# Patient Record
Sex: FEMALE | Race: WHITE | NOT HISPANIC OR LATINO | Employment: OTHER | URBAN - METROPOLITAN AREA
[De-identification: names, ages, dates, MRNs, and addresses within clinical notes are randomized per-mention and may not be internally consistent; named-entity substitution may affect disease eponyms.]

---

## 2024-01-08 ENCOUNTER — HOSPITAL ENCOUNTER (INPATIENT)
Facility: HOSPITAL | Age: 89
LOS: 5 days | Discharge: NURSING, REHAB OR LONG-TERM CARE | DRG: 522 | End: 2024-01-13
Attending: EMERGENCY MEDICINE | Admitting: INTERNAL MEDICINE
Payer: MEDICARE

## 2024-01-08 ENCOUNTER — APPOINTMENT (OUTPATIENT)
Dept: RADIOLOGY | Facility: HOSPITAL | Age: 89
DRG: 522 | End: 2024-01-08
Payer: MEDICARE

## 2024-01-08 ENCOUNTER — PREP FOR PROCEDURE (OUTPATIENT)
Dept: OPERATING ROOM | Facility: HOSPITAL | Age: 89
End: 2024-01-08
Payer: MEDICARE

## 2024-01-08 DIAGNOSIS — S72.001P CLOSED FRACTURE OF NECK OF RIGHT FEMUR WITH MALUNION: ICD-10-CM

## 2024-01-08 DIAGNOSIS — S72.8X9A: Primary | ICD-10-CM

## 2024-01-08 DIAGNOSIS — S72.001A CLOSED FRACTURE OF NECK OF RIGHT FEMUR, INITIAL ENCOUNTER (MULTI): Primary | ICD-10-CM

## 2024-01-08 DIAGNOSIS — M79.89 OTHER SPECIFIED SOFT TISSUE DISORDERS: ICD-10-CM

## 2024-01-08 DIAGNOSIS — S72.001A CLOSED FRACTURE OF NECK OF RIGHT FEMUR, INITIAL ENCOUNTER (MULTI): ICD-10-CM

## 2024-01-08 DIAGNOSIS — R22.43 LOCALIZED SWELLING, MASS AND LUMP, LOWER LIMB, BILATERAL: ICD-10-CM

## 2024-01-08 LAB
ABO GROUP (TYPE) IN BLOOD: NORMAL
ALBUMIN SERPL BCP-MCNC: 4.1 G/DL (ref 3.4–5)
ALP SERPL-CCNC: 98 U/L (ref 33–136)
ALT SERPL W P-5'-P-CCNC: 40 U/L (ref 7–45)
ANION GAP SERPL CALC-SCNC: 13 MMOL/L (ref 10–20)
ANTIBODY SCREEN: NORMAL
APTT PPP: 34 SECONDS (ref 27–38)
AST SERPL W P-5'-P-CCNC: 68 U/L (ref 9–39)
BILIRUB SERPL-MCNC: 0.6 MG/DL (ref 0–1.2)
BUN SERPL-MCNC: 12 MG/DL (ref 6–23)
CALCIUM SERPL-MCNC: 9.3 MG/DL (ref 8.6–10.3)
CHLORIDE SERPL-SCNC: 101 MMOL/L (ref 98–107)
CO2 SERPL-SCNC: 27 MMOL/L (ref 21–32)
CREAT SERPL-MCNC: 0.66 MG/DL (ref 0.5–1.05)
EGFRCR SERPLBLD CKD-EPI 2021: 84 ML/MIN/1.73M*2
ERYTHROCYTE [DISTWIDTH] IN BLOOD BY AUTOMATED COUNT: 13.2 % (ref 11.5–14.5)
GLUCOSE SERPL-MCNC: 119 MG/DL (ref 74–99)
HCT VFR BLD AUTO: 44.4 % (ref 36–46)
HGB BLD-MCNC: 14.6 G/DL (ref 12–16)
HOLD SPECIMEN: NORMAL
INR PPP: 1.1 (ref 0.9–1.1)
MCH RBC QN AUTO: 29.9 PG (ref 26–34)
MCHC RBC AUTO-ENTMCNC: 32.9 G/DL (ref 32–36)
MCV RBC AUTO: 91 FL (ref 80–100)
NRBC BLD-RTO: 0 /100 WBCS (ref 0–0)
PLATELET # BLD AUTO: 199 X10*3/UL (ref 150–450)
POTASSIUM SERPL-SCNC: 4.6 MMOL/L (ref 3.5–5.3)
PROT SERPL-MCNC: 8.2 G/DL (ref 6.4–8.2)
PROTHROMBIN TIME: 12.5 SECONDS (ref 9.8–12.8)
RBC # BLD AUTO: 4.88 X10*6/UL (ref 4–5.2)
RH FACTOR (ANTIGEN D): NORMAL
SODIUM SERPL-SCNC: 136 MMOL/L (ref 136–145)
WBC # BLD AUTO: 6 X10*3/UL (ref 4.4–11.3)

## 2024-01-08 PROCEDURE — 96375 TX/PRO/DX INJ NEW DRUG ADDON: CPT

## 2024-01-08 PROCEDURE — 80053 COMPREHEN METABOLIC PANEL: CPT

## 2024-01-08 PROCEDURE — 36415 COLL VENOUS BLD VENIPUNCTURE: CPT | Performed by: EMERGENCY MEDICINE

## 2024-01-08 PROCEDURE — 36415 COLL VENOUS BLD VENIPUNCTURE: CPT

## 2024-01-08 PROCEDURE — 70450 CT HEAD/BRAIN W/O DYE: CPT

## 2024-01-08 PROCEDURE — 2500000004 HC RX 250 GENERAL PHARMACY W/ HCPCS (ALT 636 FOR OP/ED): Performed by: STUDENT IN AN ORGANIZED HEALTH CARE EDUCATION/TRAINING PROGRAM

## 2024-01-08 PROCEDURE — 1100000001 HC PRIVATE ROOM DAILY

## 2024-01-08 PROCEDURE — 72125 CT NECK SPINE W/O DYE: CPT | Performed by: RADIOLOGY

## 2024-01-08 PROCEDURE — 99285 EMERGENCY DEPT VISIT HI MDM: CPT | Performed by: EMERGENCY MEDICINE

## 2024-01-08 PROCEDURE — 73552 X-RAY EXAM OF FEMUR 2/>: CPT | Mod: RT

## 2024-01-08 PROCEDURE — 2500000001 HC RX 250 WO HCPCS SELF ADMINISTERED DRUGS (ALT 637 FOR MEDICARE OP): Performed by: STUDENT IN AN ORGANIZED HEALTH CARE EDUCATION/TRAINING PROGRAM

## 2024-01-08 PROCEDURE — 73552 X-RAY EXAM OF FEMUR 2/>: CPT | Mod: RIGHT SIDE | Performed by: RADIOLOGY

## 2024-01-08 PROCEDURE — 86920 COMPATIBILITY TEST SPIN: CPT

## 2024-01-08 PROCEDURE — 86850 RBC ANTIBODY SCREEN: CPT | Performed by: EMERGENCY MEDICINE

## 2024-01-08 PROCEDURE — 71045 X-RAY EXAM CHEST 1 VIEW: CPT | Performed by: RADIOLOGY

## 2024-01-08 PROCEDURE — 96374 THER/PROPH/DIAG INJ IV PUSH: CPT

## 2024-01-08 PROCEDURE — 2500000004 HC RX 250 GENERAL PHARMACY W/ HCPCS (ALT 636 FOR OP/ED)

## 2024-01-08 PROCEDURE — 73502 X-RAY EXAM HIP UNI 2-3 VIEWS: CPT | Mod: RT

## 2024-01-08 PROCEDURE — 70450 CT HEAD/BRAIN W/O DYE: CPT | Performed by: RADIOLOGY

## 2024-01-08 PROCEDURE — 71045 X-RAY EXAM CHEST 1 VIEW: CPT

## 2024-01-08 PROCEDURE — 72125 CT NECK SPINE W/O DYE: CPT

## 2024-01-08 PROCEDURE — 85610 PROTHROMBIN TIME: CPT | Performed by: EMERGENCY MEDICINE

## 2024-01-08 PROCEDURE — 73502 X-RAY EXAM HIP UNI 2-3 VIEWS: CPT | Mod: RIGHT SIDE | Performed by: RADIOLOGY

## 2024-01-08 PROCEDURE — 85027 COMPLETE CBC AUTOMATED: CPT

## 2024-01-08 RX ORDER — HEPARIN SODIUM 5000 [USP'U]/ML
5000 INJECTION, SOLUTION INTRAVENOUS; SUBCUTANEOUS EVERY 8 HOURS SCHEDULED
Status: DISCONTINUED | OUTPATIENT
Start: 2024-01-08 | End: 2024-01-09

## 2024-01-08 RX ORDER — POLYETHYLENE GLYCOL 3350 17 G/17G
17 POWDER, FOR SOLUTION ORAL DAILY
Status: DISCONTINUED | OUTPATIENT
Start: 2024-01-08 | End: 2024-01-09

## 2024-01-08 RX ORDER — ATORVASTATIN CALCIUM 40 MG/1
40 TABLET, FILM COATED ORAL DAILY
COMMUNITY

## 2024-01-08 RX ORDER — LEVOTHYROXINE SODIUM 75 UG/1
75 TABLET ORAL
Status: DISCONTINUED | OUTPATIENT
Start: 2024-01-09 | End: 2024-01-09

## 2024-01-08 RX ORDER — ASPIRIN 81 MG/1
81 TABLET ORAL DAILY
COMMUNITY

## 2024-01-08 RX ORDER — ATORVASTATIN CALCIUM 40 MG/1
40 TABLET, FILM COATED ORAL DAILY
Status: DISCONTINUED | OUTPATIENT
Start: 2024-01-09 | End: 2024-01-09

## 2024-01-08 RX ORDER — ONDANSETRON HYDROCHLORIDE 2 MG/ML
4 INJECTION, SOLUTION INTRAVENOUS ONCE
Status: COMPLETED | OUTPATIENT
Start: 2024-01-08 | End: 2024-01-08

## 2024-01-08 RX ORDER — AMLODIPINE BESYLATE 5 MG/1
5 TABLET ORAL DAILY
Status: DISCONTINUED | OUTPATIENT
Start: 2024-01-09 | End: 2024-01-09

## 2024-01-08 RX ORDER — AMLODIPINE BESYLATE 5 MG/1
5 TABLET ORAL DAILY
COMMUNITY

## 2024-01-08 RX ORDER — OXYCODONE HYDROCHLORIDE 5 MG/1
5 TABLET ORAL EVERY 4 HOURS PRN
Status: DISCONTINUED | OUTPATIENT
Start: 2024-01-08 | End: 2024-01-09

## 2024-01-08 RX ORDER — CHOLECALCIFEROL (VITAMIN D3) 50 MCG
50 TABLET ORAL DAILY
COMMUNITY

## 2024-01-08 RX ORDER — LEVOTHYROXINE SODIUM 75 UG/1
75 TABLET ORAL
COMMUNITY

## 2024-01-08 RX ORDER — MORPHINE SULFATE 4 MG/ML
4 INJECTION, SOLUTION INTRAMUSCULAR; INTRAVENOUS ONCE
Status: COMPLETED | OUTPATIENT
Start: 2024-01-08 | End: 2024-01-08

## 2024-01-08 RX ORDER — ASPIRIN 81 MG/1
81 TABLET ORAL DAILY
Status: DISCONTINUED | OUTPATIENT
Start: 2024-01-09 | End: 2024-01-09

## 2024-01-08 RX ADMIN — OXYCODONE HYDROCHLORIDE 5 MG: 5 TABLET ORAL at 20:29

## 2024-01-08 RX ADMIN — HEPARIN SODIUM 5000 UNITS: 5000 INJECTION INTRAVENOUS; SUBCUTANEOUS at 23:25

## 2024-01-08 RX ADMIN — ONDANSETRON 4 MG: 2 INJECTION INTRAMUSCULAR; INTRAVENOUS at 17:43

## 2024-01-08 RX ADMIN — MORPHINE SULFATE 4 MG: 4 INJECTION, SOLUTION INTRAMUSCULAR; INTRAVENOUS at 17:43

## 2024-01-08 ASSESSMENT — LIFESTYLE VARIABLES
EVER FELT BAD OR GUILTY ABOUT YOUR DRINKING: NO
HAVE YOU EVER FELT YOU SHOULD CUT DOWN ON YOUR DRINKING: NO
HAVE PEOPLE ANNOYED YOU BY CRITICIZING YOUR DRINKING: NO
REASON UNABLE TO ASSESS: NO
EVER HAD A DRINK FIRST THING IN THE MORNING TO STEADY YOUR NERVES TO GET RID OF A HANGOVER: NO

## 2024-01-08 ASSESSMENT — PAIN DESCRIPTION - LOCATION
LOCATION: LEG
LOCATION: HIP

## 2024-01-08 ASSESSMENT — COGNITIVE AND FUNCTIONAL STATUS - GENERAL
EATING MEALS: A LITTLE
PERSONAL GROOMING: A LITTLE
HELP NEEDED FOR BATHING: A LITTLE
DRESSING REGULAR UPPER BODY CLOTHING: A LITTLE
DAILY ACTIVITIY SCORE: 18
DRESSING REGULAR LOWER BODY CLOTHING: A LITTLE
MOBILITY SCORE: 9
CLIMB 3 TO 5 STEPS WITH RAILING: TOTAL
MOVING FROM LYING ON BACK TO SITTING ON SIDE OF FLAT BED WITH BEDRAILS: A LITTLE
MOVING TO AND FROM BED TO CHAIR: TOTAL
WALKING IN HOSPITAL ROOM: TOTAL
TURNING FROM BACK TO SIDE WHILE IN FLAT BAD: A LOT
STANDING UP FROM CHAIR USING ARMS: TOTAL
TOILETING: A LITTLE

## 2024-01-08 ASSESSMENT — PAIN SCALES - GENERAL
PAINLEVEL_OUTOF10: 7
PAINLEVEL_OUTOF10: 10 - WORST POSSIBLE PAIN
PAINLEVEL_OUTOF10: 5 - MODERATE PAIN
PAINLEVEL_OUTOF10: 4
PAINLEVEL_OUTOF10: 5 - MODERATE PAIN
PAINLEVEL_OUTOF10: 0 - NO PAIN

## 2024-01-08 ASSESSMENT — PAIN - FUNCTIONAL ASSESSMENT
PAIN_FUNCTIONAL_ASSESSMENT: 0-10

## 2024-01-08 ASSESSMENT — PAIN DESCRIPTION - PAIN TYPE: TYPE: ACUTE PAIN

## 2024-01-08 ASSESSMENT — COLUMBIA-SUICIDE SEVERITY RATING SCALE - C-SSRS
6. HAVE YOU EVER DONE ANYTHING, STARTED TO DO ANYTHING, OR PREPARED TO DO ANYTHING TO END YOUR LIFE?: NO
2. HAVE YOU ACTUALLY HAD ANY THOUGHTS OF KILLING YOURSELF?: NO
1. IN THE PAST MONTH, HAVE YOU WISHED YOU WERE DEAD OR WISHED YOU COULD GO TO SLEEP AND NOT WAKE UP?: NO

## 2024-01-08 ASSESSMENT — PAIN DESCRIPTION - PROGRESSION: CLINICAL_PROGRESSION: NOT CHANGED

## 2024-01-08 ASSESSMENT — PAIN DESCRIPTION - ORIENTATION: ORIENTATION: RIGHT

## 2024-01-08 NOTE — ED PROVIDER NOTES
HPI   Chief Complaint   Patient presents with    Fall    Hip Pain       HPI  Lona is a 88-year-old female who presents to the ED with complaint of fall and hip pain.  Patient reports that she was going up a step when she lost her balance and fell 2 steps to the ground landing on her right side.  Her medical history is significant for high blood pressure, stroke, and osteoarthritis.  Patient is not on blood thinners.  She rates the pain in her hip a 5 out of 10 in severity.  Patient denies any recent illness symptoms including fever, headache, nausea, vomiting, diarrhea, or urinary symptoms.                  Br[place] Coma Scale Score: 15                  Patient History   Past Medical History:   Diagnosis Date    Personal history of other diseases of the musculoskeletal system and connective tissue 07/24/2014    History of polymyalgia rheumatica     Past Surgical History:   Procedure Laterality Date    CT HEAD ANGIO W AND WO IV CONTRAST  3/1/2019    CT HEAD ANGIO W AND WO IV CONTRAST 3/1/2019 [facility]    HIP SURGERY  08/15/2014    Hip Surgery    THYROID SURGERY  08/15/2014    Thyroid Surgery    TUBAL LIGATION  08/15/2014    Tubal Ligation     No family history on file.  Social History     Tobacco Use    Smoking status: Not on file    Smokeless tobacco: Not on file   Substance Use Topics    Alcohol use: Not on file    Drug use: Not on file       Physical Exam   ED Triage Vitals   Temp Heart Rate Resp BP   01/08/24 1707 01/08/24 1851 01/08/24 1707 01/08/24 1707   36 °C (96.8 °F) 76 17 134/79      SpO2 Temp Source Heart Rate Source Patient Position   01/08/24 1707 01/08/24 1707 01/08/24 1707 01/08/24 1707   96 % Temporal Monitor Lying      BP Location FiO2 (%)     01/08/24 1707 --     Right arm        Physical Exam  Vitals reviewed.   Constitutional:       General: She is not in acute distress.     Appearance: Normal appearance. She is not ill-appearing or toxic-appearing.   HENT:      Head: Normocephalic  and atraumatic.      Right Ear: External ear normal.      Left Ear: External ear normal.      Nose: No congestion or rhinorrhea.      Mouth/Throat:      Pharynx: No oropharyngeal exudate or posterior oropharyngeal erythema.   Eyes:      General: No scleral icterus.        Right eye: No discharge.         Left eye: No discharge.      Extraocular Movements: Extraocular movements intact.      Conjunctiva/sclera: Conjunctivae normal.      Pupils: Pupils are equal, round, and reactive to light.   Cardiovascular:      Rate and Rhythm: Normal rate and regular rhythm.      Pulses: Normal pulses.      Heart sounds: No murmur heard.     No friction rub. No gallop.   Pulmonary:      Effort: Pulmonary effort is normal. No respiratory distress.      Breath sounds: Normal breath sounds. No stridor. No wheezing, rhonchi or rales.   Abdominal:      General: Abdomen is flat. Bowel sounds are normal. There is no distension.      Palpations: Abdomen is soft. There is no mass.      Tenderness: There is no abdominal tenderness. There is no guarding or rebound.   Musculoskeletal:         General: Tenderness (Right leg.), deformity (Fracture of the mid cervical of the right femur) and signs of injury (Right lower extremity.) present. No swelling. Normal range of motion.      Cervical back: Normal range of motion and neck supple. No rigidity.      Right lower leg: No edema.      Left lower leg: No edema.   Skin:     General: Skin is warm.      Capillary Refill: Capillary refill takes less than 2 seconds.      Coloration: Skin is not jaundiced or pale.      Findings: No bruising or lesion.   Neurological:      General: No focal deficit present.      Mental Status: She is alert and oriented to person, place, and time. Mental status is at baseline.   Psychiatric:         Mood and Affect: Mood normal.         Behavior: Behavior normal.         Thought Content: Thought content normal.         ED Course & MDM   Diagnoses as of 01/08/24 8832    Oth fracture of unsp femur, init encntr for closed fracture (CMS/[facility], located within [facility])       Medical Decision Making  Pain is a 88-year-old female who presents to the ED with complaint of fall and hip pain.  Patient is awake, alert, and oriented.  She is well-appearing, nontoxic, and not in apparent distress.  Patient has a pertinent medical history of stroke, and hypertension.  We will order labs to rule out electrolyte abnormality, possible underlying infection, or severe anemia.  Will order checks x-ray to rule out cardiopulmonary abnormality, CT head without IV contrast to rule out intracranial hemorrhage or acute fracture, and CT C-spine to rule out fracture of the spine or injury to the soft tissues.  Cardiac workup was considered but not ordered given her normal vital signs, heart score 4, and no chest pain.  CBC and CMP results are unremarkable.  The chest x-ray is negative for acute cardiopulmonary process.  Radiography of the pelvis and right hip are positive for fracture of the mid cervical of the right femur.    The case was discussed with the attending, Dr. Thao, who saw and evaluated the patient at the bedside.  The recommendation is to admit the patient for Ortho follow-up and repair of the mid cervical of the right femur.  Ortho was consulted and Dr. Barros accepted with the management plan.  Medicine was also consulted for the patient to be admitted.  Dr. Mcqueen will be admitting the patient with a follow-up by Dr. Barros.  The decision to admit was communicated to the patient. she expressed understanding and agreed with the management plan.  The patient was transferred to the medicine floor in a stable condition.          Procedure  Procedures     Best Jasso MD  Resident  01/09/24 6878

## 2024-01-08 NOTE — Clinical Note
Is the patient on isolation?: No   Do you expect the patient to require telemetry (informational-only for bed management): Yes   Do you expect the patient to require observation or inpt? (informational-only for bed management): Inpatient   Bed request comments: Needs Ortho follow up for mid cervical fracture of the right femur repair. Thank you.

## 2024-01-09 ENCOUNTER — ANESTHESIA EVENT (OUTPATIENT)
Dept: OPERATING ROOM | Facility: HOSPITAL | Age: 89
DRG: 522 | End: 2024-01-09
Payer: MEDICARE

## 2024-01-09 ENCOUNTER — APPOINTMENT (OUTPATIENT)
Dept: RADIOLOGY | Facility: HOSPITAL | Age: 89
DRG: 522 | End: 2024-01-09
Payer: MEDICARE

## 2024-01-09 ENCOUNTER — ANESTHESIA (OUTPATIENT)
Dept: OPERATING ROOM | Facility: HOSPITAL | Age: 89
DRG: 522 | End: 2024-01-09
Payer: MEDICARE

## 2024-01-09 PROBLEM — M75.80 ROTATOR CUFF TENDONITIS: Status: ACTIVE | Noted: 2024-01-09

## 2024-01-09 PROBLEM — E03.9 HYPOTHYROIDISM: Status: ACTIVE | Noted: 2024-01-09

## 2024-01-09 PROBLEM — E78.5 HYPERLIPIDEMIA: Status: ACTIVE | Noted: 2024-01-09

## 2024-01-09 PROBLEM — M81.0 OSTEOPOROSIS: Status: ACTIVE | Noted: 2024-01-09

## 2024-01-09 PROBLEM — S72.001A CLOSED FRACTURE OF NECK OF RIGHT FEMUR (MULTI): Status: ACTIVE | Noted: 2024-01-08

## 2024-01-09 PROBLEM — I10 HYPERTENSION: Status: ACTIVE | Noted: 2024-01-09

## 2024-01-09 PROBLEM — R53.1 WEAKNESS: Status: ACTIVE | Noted: 2024-01-09

## 2024-01-09 PROBLEM — K21.9 ESOPHAGEAL REFLUX: Status: ACTIVE | Noted: 2024-01-09

## 2024-01-09 PROBLEM — G45.9 TRANSIENT ISCHEMIC ATTACK: Status: ACTIVE | Noted: 2018-08-12

## 2024-01-09 PROBLEM — I63.9 STROKE (MULTI): Status: ACTIVE | Noted: 2024-01-09

## 2024-01-09 PROBLEM — R26.2 AMBULATORY DYSFUNCTION: Status: ACTIVE | Noted: 2024-01-09

## 2024-01-09 LAB
ALBUMIN SERPL BCP-MCNC: 3.4 G/DL (ref 3.4–5)
ANION GAP SERPL CALC-SCNC: 9 MMOL/L (ref 10–20)
BASOPHILS # BLD AUTO: 0.01 X10*3/UL (ref 0–0.1)
BASOPHILS NFR BLD AUTO: 0.1 %
BUN SERPL-MCNC: 12 MG/DL (ref 6–23)
CALCIUM SERPL-MCNC: 8.8 MG/DL (ref 8.6–10.3)
CHLORIDE SERPL-SCNC: 101 MMOL/L (ref 98–107)
CO2 SERPL-SCNC: 30 MMOL/L (ref 21–32)
CREAT SERPL-MCNC: 0.63 MG/DL (ref 0.5–1.05)
EGFRCR SERPLBLD CKD-EPI 2021: 85 ML/MIN/1.73M*2
EOSINOPHIL # BLD AUTO: 0.01 X10*3/UL (ref 0–0.4)
EOSINOPHIL NFR BLD AUTO: 0.1 %
ERYTHROCYTE [DISTWIDTH] IN BLOOD BY AUTOMATED COUNT: 13.2 % (ref 11.5–14.5)
GLUCOSE SERPL-MCNC: 115 MG/DL (ref 74–99)
HCT VFR BLD AUTO: 42.3 % (ref 36–46)
HGB BLD-MCNC: 13.5 G/DL (ref 12–16)
IMM GRANULOCYTES # BLD AUTO: 0.03 X10*3/UL (ref 0–0.5)
IMM GRANULOCYTES NFR BLD AUTO: 0.3 % (ref 0–0.9)
LYMPHOCYTES # BLD AUTO: 1.57 X10*3/UL (ref 0.8–3)
LYMPHOCYTES NFR BLD AUTO: 16.9 %
MAGNESIUM SERPL-MCNC: 2.09 MG/DL (ref 1.6–2.4)
MCH RBC QN AUTO: 29.4 PG (ref 26–34)
MCHC RBC AUTO-ENTMCNC: 31.9 G/DL (ref 32–36)
MCV RBC AUTO: 92 FL (ref 80–100)
MONOCYTES # BLD AUTO: 1.07 X10*3/UL (ref 0.05–0.8)
MONOCYTES NFR BLD AUTO: 11.5 %
NEUTROPHILS # BLD AUTO: 6.59 X10*3/UL (ref 1.6–5.5)
NEUTROPHILS NFR BLD AUTO: 71.1 %
NRBC BLD-RTO: 0 /100 WBCS (ref 0–0)
PHOSPHATE SERPL-MCNC: 3.9 MG/DL (ref 2.5–4.9)
PLATELET # BLD AUTO: 177 X10*3/UL (ref 150–450)
POTASSIUM SERPL-SCNC: 4 MMOL/L (ref 3.5–5.3)
RBC # BLD AUTO: 4.59 X10*6/UL (ref 4–5.2)
SODIUM SERPL-SCNC: 136 MMOL/L (ref 136–145)
WBC # BLD AUTO: 9.3 X10*3/UL (ref 4.4–11.3)

## 2024-01-09 PROCEDURE — 2500000004 HC RX 250 GENERAL PHARMACY W/ HCPCS (ALT 636 FOR OP/ED): Performed by: ANESTHESIOLOGIST ASSISTANT

## 2024-01-09 PROCEDURE — 2500000001 HC RX 250 WO HCPCS SELF ADMINISTERED DRUGS (ALT 637 FOR MEDICARE OP): Performed by: INTERNAL MEDICINE

## 2024-01-09 PROCEDURE — A4217 STERILE WATER/SALINE, 500 ML: HCPCS | Performed by: ORTHOPAEDIC SURGERY

## 2024-01-09 PROCEDURE — A27125 PR PARTIAL HIP REPLACEMENT: Performed by: ANESTHESIOLOGY

## 2024-01-09 PROCEDURE — 3600000010 HC OR TIME - EACH INCREMENTAL 1 MINUTE - PROCEDURE LEVEL FIVE: Performed by: ORTHOPAEDIC SURGERY

## 2024-01-09 PROCEDURE — A27125 PR PARTIAL HIP REPLACEMENT: Performed by: NURSE ANESTHETIST, CERTIFIED REGISTERED

## 2024-01-09 PROCEDURE — 99100 ANES PT EXTEME AGE<1 YR&>70: CPT | Performed by: ANESTHESIOLOGY

## 2024-01-09 PROCEDURE — 2500000004 HC RX 250 GENERAL PHARMACY W/ HCPCS (ALT 636 FOR OP/ED): Performed by: ORTHOPAEDIC SURGERY

## 2024-01-09 PROCEDURE — 0SRR019 REPLACEMENT OF RIGHT HIP JOINT, FEMORAL SURFACE WITH METAL SYNTHETIC SUBSTITUTE, CEMENTED, OPEN APPROACH: ICD-10-PCS | Performed by: ORTHOPAEDIC SURGERY

## 2024-01-09 PROCEDURE — 2500000001 HC RX 250 WO HCPCS SELF ADMINISTERED DRUGS (ALT 637 FOR MEDICARE OP): Performed by: STUDENT IN AN ORGANIZED HEALTH CARE EDUCATION/TRAINING PROGRAM

## 2024-01-09 PROCEDURE — 27236 TREAT THIGH FRACTURE: CPT | Performed by: PHYSICIAN ASSISTANT

## 2024-01-09 PROCEDURE — C1776 JOINT DEVICE (IMPLANTABLE): HCPCS | Performed by: ORTHOPAEDIC SURGERY

## 2024-01-09 PROCEDURE — 7100000002 HC RECOVERY ROOM TIME - EACH INCREMENTAL 1 MINUTE: Performed by: ORTHOPAEDIC SURGERY

## 2024-01-09 PROCEDURE — 2500000005 HC RX 250 GENERAL PHARMACY W/O HCPCS: Performed by: ORTHOPAEDIC SURGERY

## 2024-01-09 PROCEDURE — 73502 X-RAY EXAM HIP UNI 2-3 VIEWS: CPT | Mod: RT

## 2024-01-09 PROCEDURE — 36415 COLL VENOUS BLD VENIPUNCTURE: CPT | Performed by: STUDENT IN AN ORGANIZED HEALTH CARE EDUCATION/TRAINING PROGRAM

## 2024-01-09 PROCEDURE — C1713 ANCHOR/SCREW BN/BN,TIS/BN: HCPCS | Performed by: ORTHOPAEDIC SURGERY

## 2024-01-09 PROCEDURE — 3700000002 HC GENERAL ANESTHESIA TIME - EACH INCREMENTAL 1 MINUTE: Performed by: ORTHOPAEDIC SURGERY

## 2024-01-09 PROCEDURE — 3600000005 HC OR TIME - INITIAL BASE CHARGE - PROCEDURE LEVEL FIVE: Performed by: ORTHOPAEDIC SURGERY

## 2024-01-09 PROCEDURE — 7100000001 HC RECOVERY ROOM TIME - INITIAL BASE CHARGE: Performed by: ORTHOPAEDIC SURGERY

## 2024-01-09 PROCEDURE — 27236 TREAT THIGH FRACTURE: CPT | Performed by: ORTHOPAEDIC SURGERY

## 2024-01-09 PROCEDURE — 2500000001 HC RX 250 WO HCPCS SELF ADMINISTERED DRUGS (ALT 637 FOR MEDICARE OP): Performed by: ORTHOPAEDIC SURGERY

## 2024-01-09 PROCEDURE — 2720000007 HC OR 272 NO HCPCS: Performed by: ORTHOPAEDIC SURGERY

## 2024-01-09 PROCEDURE — 99223 1ST HOSP IP/OBS HIGH 75: CPT | Performed by: INTERNAL MEDICINE

## 2024-01-09 PROCEDURE — 73502 X-RAY EXAM HIP UNI 2-3 VIEWS: CPT | Mod: RIGHT SIDE | Performed by: RADIOLOGY

## 2024-01-09 PROCEDURE — 83735 ASSAY OF MAGNESIUM: CPT | Performed by: STUDENT IN AN ORGANIZED HEALTH CARE EDUCATION/TRAINING PROGRAM

## 2024-01-09 PROCEDURE — 3700000001 HC GENERAL ANESTHESIA TIME - INITIAL BASE CHARGE: Performed by: ORTHOPAEDIC SURGERY

## 2024-01-09 PROCEDURE — 99223 1ST HOSP IP/OBS HIGH 75: CPT | Performed by: ORTHOPAEDIC SURGERY

## 2024-01-09 PROCEDURE — 1100000001 HC PRIVATE ROOM DAILY

## 2024-01-09 PROCEDURE — 2780000003 HC OR 278 NO HCPCS: Performed by: ORTHOPAEDIC SURGERY

## 2024-01-09 PROCEDURE — 80069 RENAL FUNCTION PANEL: CPT | Performed by: STUDENT IN AN ORGANIZED HEALTH CARE EDUCATION/TRAINING PROGRAM

## 2024-01-09 PROCEDURE — 2500000004 HC RX 250 GENERAL PHARMACY W/ HCPCS (ALT 636 FOR OP/ED): Performed by: NURSE ANESTHETIST, CERTIFIED REGISTERED

## 2024-01-09 PROCEDURE — 85025 COMPLETE CBC W/AUTO DIFF WBC: CPT | Performed by: STUDENT IN AN ORGANIZED HEALTH CARE EDUCATION/TRAINING PROGRAM

## 2024-01-09 DEVICE — IMPLANTABLE DEVICE: Type: IMPLANTABLE DEVICE | Site: HIP | Status: FUNCTIONAL

## 2024-01-09 DEVICE — IMPLANTABLE DEVICE
Type: IMPLANTABLE DEVICE | Site: HIP | Status: FUNCTIONAL
Brand: BIOMET® BONE CEMENT R

## 2024-01-09 DEVICE — IMPLANTABLE DEVICE
Type: IMPLANTABLE DEVICE | Site: HIP | Status: FUNCTIONAL
Brand: QUIK-USE®

## 2024-01-09 RX ORDER — NALOXONE HYDROCHLORIDE 0.4 MG/ML
0.2 INJECTION, SOLUTION INTRAMUSCULAR; INTRAVENOUS; SUBCUTANEOUS EVERY 5 MIN PRN
Status: DISCONTINUED | OUTPATIENT
Start: 2024-01-09 | End: 2024-01-13 | Stop reason: HOSPADM

## 2024-01-09 RX ORDER — SODIUM CHLORIDE, SODIUM LACTATE, POTASSIUM CHLORIDE, CALCIUM CHLORIDE 600; 310; 30; 20 MG/100ML; MG/100ML; MG/100ML; MG/100ML
50 INJECTION, SOLUTION INTRAVENOUS CONTINUOUS
Status: ACTIVE | OUTPATIENT
Start: 2024-01-09 | End: 2024-01-10

## 2024-01-09 RX ORDER — ONDANSETRON 4 MG/1
4 TABLET, ORALLY DISINTEGRATING ORAL EVERY 8 HOURS PRN
Status: DISCONTINUED | OUTPATIENT
Start: 2024-01-09 | End: 2024-01-13 | Stop reason: HOSPADM

## 2024-01-09 RX ORDER — CEFAZOLIN 1 G/1
INJECTION, POWDER, FOR SOLUTION INTRAVENOUS AS NEEDED
Status: DISCONTINUED | OUTPATIENT
Start: 2024-01-09 | End: 2024-01-09

## 2024-01-09 RX ORDER — BISACODYL 5 MG
10 TABLET, DELAYED RELEASE (ENTERIC COATED) ORAL DAILY PRN
Status: DISCONTINUED | OUTPATIENT
Start: 2024-01-09 | End: 2024-01-13 | Stop reason: HOSPADM

## 2024-01-09 RX ORDER — CEFAZOLIN SODIUM 2 G/100ML
2 INJECTION, SOLUTION INTRAVENOUS EVERY 8 HOURS
Status: COMPLETED | OUTPATIENT
Start: 2024-01-09 | End: 2024-01-10

## 2024-01-09 RX ORDER — PROCHLORPERAZINE 25 MG/1
25 SUPPOSITORY RECTAL EVERY 12 HOURS PRN
Status: DISCONTINUED | OUTPATIENT
Start: 2024-01-09 | End: 2024-01-13 | Stop reason: HOSPADM

## 2024-01-09 RX ORDER — POLYETHYLENE GLYCOL 3350 17 G/17G
17 POWDER, FOR SOLUTION ORAL DAILY
Status: DISCONTINUED | OUTPATIENT
Start: 2024-01-09 | End: 2024-01-09

## 2024-01-09 RX ORDER — ACETAMINOPHEN 325 MG/1
650 TABLET ORAL EVERY 6 HOURS SCHEDULED
Status: DISCONTINUED | OUTPATIENT
Start: 2024-01-09 | End: 2024-01-13 | Stop reason: HOSPADM

## 2024-01-09 RX ORDER — HYDROMORPHONE HYDROCHLORIDE 1 MG/ML
0.1 INJECTION, SOLUTION INTRAMUSCULAR; INTRAVENOUS; SUBCUTANEOUS EVERY 5 MIN PRN
Status: DISCONTINUED | OUTPATIENT
Start: 2024-01-09 | End: 2024-01-09 | Stop reason: HOSPADM

## 2024-01-09 RX ORDER — PHENYLEPHRINE 10 MG/250 ML(40 MCG/ML)IN 0.9 % SOD.CHLORIDE INTRAVENOUS
CONTINUOUS PRN
Status: DISCONTINUED | OUTPATIENT
Start: 2024-01-09 | End: 2024-01-09

## 2024-01-09 RX ORDER — PROCHLORPERAZINE EDISYLATE 5 MG/ML
10 INJECTION INTRAMUSCULAR; INTRAVENOUS EVERY 6 HOURS PRN
Status: DISCONTINUED | OUTPATIENT
Start: 2024-01-09 | End: 2024-01-13 | Stop reason: HOSPADM

## 2024-01-09 RX ORDER — ENOXAPARIN SODIUM 100 MG/ML
40 INJECTION SUBCUTANEOUS DAILY
Status: DISCONTINUED | OUTPATIENT
Start: 2024-01-09 | End: 2024-01-13 | Stop reason: HOSPADM

## 2024-01-09 RX ORDER — SODIUM CHLORIDE 0.9 G/100ML
IRRIGANT IRRIGATION AS NEEDED
Status: DISCONTINUED | OUTPATIENT
Start: 2024-01-09 | End: 2024-01-09 | Stop reason: HOSPADM

## 2024-01-09 RX ORDER — HYDRALAZINE HYDROCHLORIDE 20 MG/ML
5 INJECTION INTRAMUSCULAR; INTRAVENOUS EVERY 30 MIN PRN
Status: DISCONTINUED | OUTPATIENT
Start: 2024-01-09 | End: 2024-01-09 | Stop reason: HOSPADM

## 2024-01-09 RX ORDER — HYDROMORPHONE HYDROCHLORIDE 1 MG/ML
0.2 INJECTION, SOLUTION INTRAMUSCULAR; INTRAVENOUS; SUBCUTANEOUS EVERY 5 MIN PRN
Status: DISCONTINUED | OUTPATIENT
Start: 2024-01-09 | End: 2024-01-09 | Stop reason: HOSPADM

## 2024-01-09 RX ORDER — ONDANSETRON HYDROCHLORIDE 2 MG/ML
INJECTION, SOLUTION INTRAVENOUS AS NEEDED
Status: DISCONTINUED | OUTPATIENT
Start: 2024-01-09 | End: 2024-01-09

## 2024-01-09 RX ORDER — FENTANYL CITRATE 50 UG/ML
INJECTION, SOLUTION INTRAMUSCULAR; INTRAVENOUS AS NEEDED
Status: DISCONTINUED | OUTPATIENT
Start: 2024-01-09 | End: 2024-01-09

## 2024-01-09 RX ORDER — ONDANSETRON HYDROCHLORIDE 2 MG/ML
4 INJECTION, SOLUTION INTRAVENOUS ONCE AS NEEDED
Status: DISCONTINUED | OUTPATIENT
Start: 2024-01-09 | End: 2024-01-09 | Stop reason: HOSPADM

## 2024-01-09 RX ORDER — DIPHENHYDRAMINE HYDROCHLORIDE 50 MG/ML
12.5 INJECTION INTRAMUSCULAR; INTRAVENOUS EVERY 6 HOURS PRN
Status: DISCONTINUED | OUTPATIENT
Start: 2024-01-09 | End: 2024-01-13 | Stop reason: HOSPADM

## 2024-01-09 RX ORDER — OXYCODONE HYDROCHLORIDE 10 MG/1
10 TABLET ORAL EVERY 6 HOURS PRN
Status: DISCONTINUED | OUTPATIENT
Start: 2024-01-09 | End: 2024-01-10

## 2024-01-09 RX ORDER — PROPOFOL 10 MG/ML
INJECTION, EMULSION INTRAVENOUS AS NEEDED
Status: DISCONTINUED | OUTPATIENT
Start: 2024-01-09 | End: 2024-01-09

## 2024-01-09 RX ORDER — MORPHINE SULFATE 2 MG/ML
2 INJECTION, SOLUTION INTRAMUSCULAR; INTRAVENOUS EVERY 2 HOUR PRN
Status: DISCONTINUED | OUTPATIENT
Start: 2024-01-09 | End: 2024-01-10

## 2024-01-09 RX ORDER — CYCLOBENZAPRINE HCL 10 MG
10 TABLET ORAL 3 TIMES DAILY PRN
Status: DISCONTINUED | OUTPATIENT
Start: 2024-01-09 | End: 2024-01-10

## 2024-01-09 RX ORDER — SODIUM CHLORIDE, SODIUM LACTATE, POTASSIUM CHLORIDE, CALCIUM CHLORIDE 600; 310; 30; 20 MG/100ML; MG/100ML; MG/100ML; MG/100ML
INJECTION, SOLUTION INTRAVENOUS CONTINUOUS PRN
Status: DISCONTINUED | OUTPATIENT
Start: 2024-01-09 | End: 2024-01-09

## 2024-01-09 RX ORDER — MIDAZOLAM HYDROCHLORIDE 1 MG/ML
1 INJECTION, SOLUTION INTRAMUSCULAR; INTRAVENOUS ONCE AS NEEDED
Status: DISCONTINUED | OUTPATIENT
Start: 2024-01-09 | End: 2024-01-09 | Stop reason: HOSPADM

## 2024-01-09 RX ORDER — ALBUTEROL SULFATE 0.83 MG/ML
2.5 SOLUTION RESPIRATORY (INHALATION) ONCE AS NEEDED
Status: DISCONTINUED | OUTPATIENT
Start: 2024-01-09 | End: 2024-01-09 | Stop reason: HOSPADM

## 2024-01-09 RX ORDER — HYDROMORPHONE HYDROCHLORIDE 1 MG/ML
0.5 INJECTION, SOLUTION INTRAMUSCULAR; INTRAVENOUS; SUBCUTANEOUS EVERY 5 MIN PRN
Status: DISCONTINUED | OUTPATIENT
Start: 2024-01-09 | End: 2024-01-09 | Stop reason: HOSPADM

## 2024-01-09 RX ORDER — ACETAMINOPHEN 325 MG/1
650 TABLET ORAL EVERY 4 HOURS PRN
Status: DISCONTINUED | OUTPATIENT
Start: 2024-01-09 | End: 2024-01-09 | Stop reason: HOSPADM

## 2024-01-09 RX ORDER — DOCUSATE SODIUM 100 MG/1
100 CAPSULE, LIQUID FILLED ORAL 2 TIMES DAILY
Status: DISCONTINUED | OUTPATIENT
Start: 2024-01-09 | End: 2024-01-13 | Stop reason: HOSPADM

## 2024-01-09 RX ORDER — SODIUM CHLORIDE, SODIUM LACTATE, POTASSIUM CHLORIDE, CALCIUM CHLORIDE 600; 310; 30; 20 MG/100ML; MG/100ML; MG/100ML; MG/100ML
100 INJECTION, SOLUTION INTRAVENOUS CONTINUOUS
Status: DISCONTINUED | OUTPATIENT
Start: 2024-01-09 | End: 2024-01-09 | Stop reason: HOSPADM

## 2024-01-09 RX ORDER — ONDANSETRON HYDROCHLORIDE 2 MG/ML
4 INJECTION, SOLUTION INTRAVENOUS EVERY 8 HOURS PRN
Status: DISCONTINUED | OUTPATIENT
Start: 2024-01-09 | End: 2024-01-13 | Stop reason: HOSPADM

## 2024-01-09 RX ORDER — PROCHLORPERAZINE MALEATE 10 MG
10 TABLET ORAL EVERY 6 HOURS PRN
Status: DISCONTINUED | OUTPATIENT
Start: 2024-01-09 | End: 2024-01-13 | Stop reason: HOSPADM

## 2024-01-09 RX ORDER — CEFAZOLIN SODIUM 2 G/50ML
2 SOLUTION INTRAVENOUS EVERY 8 HOURS
Status: DISCONTINUED | OUTPATIENT
Start: 2024-01-09 | End: 2024-01-09 | Stop reason: RX

## 2024-01-09 RX ADMIN — ONDANSETRON 4 MG: 2 INJECTION INTRAMUSCULAR; INTRAVENOUS at 22:31

## 2024-01-09 RX ADMIN — ATORVASTATIN CALCIUM 40 MG: 40 TABLET, FILM COATED ORAL at 09:51

## 2024-01-09 RX ADMIN — ONDANSETRON 4 MG: 2 INJECTION, SOLUTION INTRAMUSCULAR; INTRAVENOUS at 14:54

## 2024-01-09 RX ADMIN — FENTANYL CITRATE 50 MCG: 50 INJECTION, SOLUTION INTRAMUSCULAR; INTRAVENOUS at 14:56

## 2024-01-09 RX ADMIN — PROPOFOL 20 MG: 10 INJECTION, EMULSION INTRAVENOUS at 14:40

## 2024-01-09 RX ADMIN — SODIUM CHLORIDE, POTASSIUM CHLORIDE, SODIUM LACTATE AND CALCIUM CHLORIDE: 600; 310; 30; 20 INJECTION, SOLUTION INTRAVENOUS at 13:30

## 2024-01-09 RX ADMIN — CEFAZOLIN SODIUM 2 G: 2 INJECTION, SOLUTION INTRAVENOUS at 22:06

## 2024-01-09 RX ADMIN — ACETAMINOPHEN 650 MG: 325 TABLET ORAL at 17:40

## 2024-01-09 RX ADMIN — OXYCODONE HYDROCHLORIDE 10 MG: 10 TABLET ORAL at 17:41

## 2024-01-09 RX ADMIN — Medication 1.4 MCG/KG/MIN: at 14:47

## 2024-01-09 RX ADMIN — ENOXAPARIN SODIUM 40 MG: 40 INJECTION SUBCUTANEOUS at 17:41

## 2024-01-09 RX ADMIN — SODIUM CHLORIDE, POTASSIUM CHLORIDE, SODIUM LACTATE AND CALCIUM CHLORIDE 50 ML/HR: 600; 310; 30; 20 INJECTION, SOLUTION INTRAVENOUS at 17:35

## 2024-01-09 RX ADMIN — OXYCODONE HYDROCHLORIDE 5 MG: 5 TABLET ORAL at 09:51

## 2024-01-09 RX ADMIN — CEFAZOLIN 2 G: 330 INJECTION, POWDER, FOR SOLUTION INTRAMUSCULAR; INTRAVENOUS at 14:34

## 2024-01-09 RX ADMIN — DOCUSATE SODIUM 100 MG: 100 CAPSULE, LIQUID FILLED ORAL at 22:06

## 2024-01-09 RX ADMIN — PROPOFOL 30 MG: 10 INJECTION, EMULSION INTRAVENOUS at 14:24

## 2024-01-09 RX ADMIN — FENTANYL CITRATE 50 MCG: 50 INJECTION, SOLUTION INTRAMUSCULAR; INTRAVENOUS at 14:40

## 2024-01-09 RX ADMIN — Medication 2 L/MIN: at 17:15

## 2024-01-09 SDOH — ECONOMIC STABILITY: FOOD INSECURITY: WITHIN THE PAST 12 MONTHS, YOU WORRIED THAT YOUR FOOD WOULD RUN OUT BEFORE YOU GOT MONEY TO BUY MORE.: NEVER TRUE

## 2024-01-09 SDOH — SOCIAL STABILITY: SOCIAL INSECURITY: DOES ANYONE TRY TO KEEP YOU FROM HAVING/CONTACTING OTHER FRIENDS OR DOING THINGS OUTSIDE YOUR HOME?: NO

## 2024-01-09 SDOH — ECONOMIC STABILITY: FOOD INSECURITY: WITHIN THE PAST 12 MONTHS, THE FOOD YOU BOUGHT JUST DIDN'T LAST AND YOU DIDN'T HAVE MONEY TO GET MORE.: NEVER TRUE

## 2024-01-09 SDOH — ECONOMIC STABILITY: INCOME INSECURITY: IN THE PAST 12 MONTHS, HAS THE ELECTRIC, GAS, OIL, OR WATER COMPANY THREATENED TO SHUT OFF SERVICE IN YOUR HOME?: NO

## 2024-01-09 SDOH — SOCIAL STABILITY: SOCIAL INSECURITY: DO YOU FEEL ANYONE HAS EXPLOITED OR TAKEN ADVANTAGE OF YOU FINANCIALLY OR OF YOUR PERSONAL PROPERTY?: NO

## 2024-01-09 SDOH — SOCIAL STABILITY: SOCIAL INSECURITY: HAVE YOU HAD THOUGHTS OF HARMING ANYONE ELSE?: NO

## 2024-01-09 SDOH — HEALTH STABILITY: MENTAL HEALTH: CURRENT SMOKER: 0

## 2024-01-09 SDOH — SOCIAL STABILITY: SOCIAL INSECURITY: HAS ANYONE EVER THREATENED TO HURT YOUR FAMILY OR YOUR PETS?: NO

## 2024-01-09 SDOH — SOCIAL STABILITY: SOCIAL INSECURITY: DO YOU FEEL UNSAFE GOING BACK TO THE PLACE WHERE YOU ARE LIVING?: NO

## 2024-01-09 SDOH — SOCIAL STABILITY: SOCIAL INSECURITY: ABUSE: ADULT

## 2024-01-09 SDOH — SOCIAL STABILITY: SOCIAL INSECURITY: WERE YOU ABLE TO COMPLETE ALL THE BEHAVIORAL HEALTH SCREENINGS?: YES

## 2024-01-09 SDOH — SOCIAL STABILITY: SOCIAL INSECURITY: ARE YOU OR HAVE YOU BEEN THREATENED OR ABUSED PHYSICALLY, EMOTIONALLY, OR SEXUALLY BY ANYONE?: NO

## 2024-01-09 SDOH — SOCIAL STABILITY: SOCIAL INSECURITY: ARE THERE ANY APPARENT SIGNS OF INJURIES/BEHAVIORS THAT COULD BE RELATED TO ABUSE/NEGLECT?: NO

## 2024-01-09 ASSESSMENT — PAIN DESCRIPTION - DESCRIPTORS: DESCRIPTORS: ACHING

## 2024-01-09 ASSESSMENT — ACTIVITIES OF DAILY LIVING (ADL)
GROOMING: NEEDS ASSISTANCE
WALKS IN HOME: NEEDS ASSISTANCE
HEARING - LEFT EAR: FUNCTIONAL
DRESSING YOURSELF: NEEDS ASSISTANCE
ASSISTIVE_DEVICE: WALKER
LACK_OF_TRANSPORTATION: NO
TOILETING: NEEDS ASSISTANCE
PATIENT'S MEMORY ADEQUATE TO SAFELY COMPLETE DAILY ACTIVITIES?: NO
ADEQUATE_TO_COMPLETE_ADL: YES
HEARING - RIGHT EAR: FUNCTIONAL
JUDGMENT_ADEQUATE_SAFELY_COMPLETE_DAILY_ACTIVITIES: YES
BATHING: NEEDS ASSISTANCE
LACK_OF_TRANSPORTATION: NO
FEEDING YOURSELF: NEEDS ASSISTANCE

## 2024-01-09 ASSESSMENT — COGNITIVE AND FUNCTIONAL STATUS - GENERAL
DAILY ACTIVITIY SCORE: 18
WALKING IN HOSPITAL ROOM: A LOT
TURNING FROM BACK TO SIDE WHILE IN FLAT BAD: A LOT
PATIENT BASELINE BEDBOUND: NO
MOBILITY SCORE: 13
TOILETING: A LITTLE
HELP NEEDED FOR BATHING: A LITTLE
MOVING FROM LYING ON BACK TO SITTING ON SIDE OF FLAT BED WITH BEDRAILS: A LITTLE
DRESSING REGULAR LOWER BODY CLOTHING: A LITTLE
PERSONAL GROOMING: A LITTLE
CLIMB 3 TO 5 STEPS WITH RAILING: A LOT
MOVING TO AND FROM BED TO CHAIR: A LOT
STANDING UP FROM CHAIR USING ARMS: A LOT
DRESSING REGULAR UPPER BODY CLOTHING: A LITTLE
EATING MEALS: A LITTLE

## 2024-01-09 ASSESSMENT — PAIN - FUNCTIONAL ASSESSMENT
PAIN_FUNCTIONAL_ASSESSMENT: 0-10
PAIN_FUNCTIONAL_ASSESSMENT: 0-10
PAIN_FUNCTIONAL_ASSESSMENT: UNABLE TO SELF-REPORT
PAIN_FUNCTIONAL_ASSESSMENT: 0-10

## 2024-01-09 ASSESSMENT — PAIN SCALES - GENERAL
PAINLEVEL_OUTOF10: 0 - NO PAIN
PAINLEVEL_OUTOF10: 0 - NO PAIN
PAINLEVEL_OUTOF10: 4
PAINLEVEL_OUTOF10: 6
PAINLEVEL_OUTOF10: 3
PAINLEVEL_OUTOF10: 5 - MODERATE PAIN
PAINLEVEL_OUTOF10: 0 - NO PAIN

## 2024-01-09 ASSESSMENT — LIFESTYLE VARIABLES
SKIP TO QUESTIONS 9-10: 1
HOW MANY STANDARD DRINKS CONTAINING ALCOHOL DO YOU HAVE ON A TYPICAL DAY: PATIENT DOES NOT DRINK
AUDIT-C TOTAL SCORE: 0
HOW OFTEN DO YOU HAVE A DRINK CONTAINING ALCOHOL: NEVER
HOW OFTEN DO YOU HAVE 6 OR MORE DRINKS ON ONE OCCASION: NEVER
AUDIT-C TOTAL SCORE: 0

## 2024-01-09 ASSESSMENT — ENCOUNTER SYMPTOMS
MYALGIAS: 1
JOINT SWELLING: 1

## 2024-01-09 ASSESSMENT — PATIENT HEALTH QUESTIONNAIRE - PHQ9
2. FEELING DOWN, DEPRESSED OR HOPELESS: NOT AT ALL
1. LITTLE INTEREST OR PLEASURE IN DOING THINGS: NOT AT ALL
SUM OF ALL RESPONSES TO PHQ9 QUESTIONS 1 & 2: 0

## 2024-01-09 ASSESSMENT — PAIN DESCRIPTION - LOCATION: LOCATION: LEG

## 2024-01-09 NOTE — H&P
History Of Present Illness  Lona Sims is a 88 y.o. female presenting with ***.     Past Medical History  Past Medical History:   Diagnosis Date    Personal history of other diseases of the musculoskeletal system and connective tissue 07/24/2014    History of polymyalgia rheumatica       Surgical History  Past Surgical History:   Procedure Laterality Date    CT HEAD ANGIO W AND WO IV CONTRAST  3/1/2019    CT HEAD ANGIO W AND WO IV CONTRAST 3/1/2019 [facility]    HIP SURGERY  08/15/2014    Hip Surgery    THYROID SURGERY  08/15/2014    Thyroid Surgery    TUBAL LIGATION  08/15/2014    Tubal Ligation        Social History  She has no history on file for tobacco use, alcohol use, and drug use.    Family History  No family history on file.     Allergies  Penicillins and Pravastatin    Review of Systems     Physical Exam     Last Recorded Vitals  There were no vitals taken for this visit.    Relevant Results  {If you would like to pull in Medications, type .meds     If you would like to pull in Lab results for the last 24 hours, type .[id number]    If you would like to pull in Imaging results, type .imgrslt :99}      ***     Assessment/Plan   {Assess/PlanSmartLinks:30373}    ***       I spent *** minutes in the professional and overall care of this patient.      Zachariah Barros MD

## 2024-01-09 NOTE — SIGNIFICANT EVENT
"Ms Sims is an 89yo woman with hypothyroidism, TIA (remote), hx PMR admitted with femur fracture in the setting of mechanical fall.     Patient seen this AM. She denies pain. She is anxious for OR. She denies dyspnea or chest pain. Of note, patient does have a systolic murmur that she states she has had for \"many years.\" On review of chart, TTE in 2018 with no e/o valvular disease. Chart report of mild aortic stenosis on problem list. Patient asymptomatic.     #Displaced subcapital/mid cervical fracture of the right femur   -ortho consulted - plan for OR this afternoon  -analgesia with tylenol, oxy, dilaudid for breakthrough  -bowel regimen  -PT/OT following OR     #hypothyroidism  -cont levothyroxine    #hx TIA  -cont ASA     #HTN  -cont amlodipine (held this AM for OR)    FEN/GI: NPO for OR  Access: PIV  Ppx: holding for OR  Code: full    Dispo likely rehab pending OR and therapy evals  "

## 2024-01-09 NOTE — ANESTHESIA POSTPROCEDURE EVALUATION
Patient: Lona Sims    Procedure Summary       Date: 01/09/24 Room / Location: [facility] OR 07 / Virtual [facility] OR    Anesthesia Start: 1414 Anesthesia Stop: 1559    Procedure: Hip Hemiarthroplasty (Right: Hip) Diagnosis:       Closed fracture of neck of right femur, initial encounter (CMS/[facility])      (Closed fracture of neck of right femur, initial encounter (CMS/[facility]) [S72.001A])    Surgeons: Zachariah Barros MD Responsible Provider: Perry Martell MD    Anesthesia Type: general ASA Status: 4            Anesthesia Type: general    Vitals Value Taken Time   /56 01/09/24 1556   Temp 37.1 01/09/24 1559   Pulse 80 01/09/24 1558   Resp 16 01/09/24 1559   SpO2 93 % 01/09/24 1558   Vitals shown include unvalidated device data.    Anesthesia Post Evaluation    Patient location during evaluation: PACU  Patient participation: complete - patient cannot participate  Level of consciousness: sleepy but conscious  Pain management: adequate  Airway patency: patent  Cardiovascular status: acceptable  Respiratory status: acceptable  Hydration status: acceptable  Postoperative Nausea and Vomiting: none      No notable events documented.

## 2024-01-09 NOTE — CARE PLAN
Problem: Fall/Injury  Goal: Not fall by end of shift  Outcome: Progressing  Goal: Be free from injury by end of the shift  Outcome: Progressing  Goal: Verbalize understanding of personal risk factors for fall in the hospital  Outcome: Progressing  Goal: Verbalize understanding of risk factor reduction measures to prevent injury from fall in the home  Outcome: Progressing  Goal: Use assistive devices by end of the shift  Outcome: Progressing  Goal: Pace activities to prevent fatigue by end of the shift  Outcome: Progressing     Problem: Skin  Goal: Decreased wound size/increased tissue granulation at next dressing change  Outcome: Progressing  Flowsheets (Taken 1/8/2024 2023)  Decreased wound size/increased tissue granulation at next dressing change: Promote sleep for wound healing  Goal: Participates in plan/prevention/treatment measures  Outcome: Progressing  Goal: Prevent/manage excess moisture  Outcome: Progressing  Goal: Prevent/minimize sheer/friction injuries  Outcome: Progressing  Goal: Promote/optimize nutrition  Outcome: Progressing  Goal: Promote skin healing  Outcome: Progressing     Problem: Pain  Goal: Takes deep breaths with improved pain control throughout the shift  Outcome: Progressing  Goal: Turns in bed with improved pain control throughout the shift  Outcome: Progressing  Goal: Walks with improved pain control throughout the shift  Outcome: Progressing  Goal: Performs ADL's with improved pain control throughout shift  Outcome: Progressing  Goal: Participates in PT with improved pain control throughout the shift  Outcome: Progressing  Goal: Free from opioid side effects throughout the shift  Outcome: Progressing  Goal: Free from acute confusion related to pain meds throughout the shift  Outcome: Progressing   The patient's goals for the shift include      The clinical goals for the shift include

## 2024-01-09 NOTE — CARE PLAN
The patient's goals for the shift include      The clinical goals for the shift include Pt will have surgery by end of shift 1/9/24    Pt had surgery and tolerated well.

## 2024-01-09 NOTE — NURSING NOTE
1245 - Pt going down to surgery at this time. Daughter at bedside.     1645- Pt returned to floor at this time. Her daughter came up to to see her. Pt able to make needs known and has been medicated for pain. Surgical site clean and dressing dry with no drainage observed. Pillow in place and SCDs on.

## 2024-01-09 NOTE — CONSULTS
History: Lona is an 88-year-old female who fell injuring her right hip.  She was found to have a displaced femoral neck fracture.  She was admitted for potential surgical fixation.    Past medical history: Multiple  Medications: Multiple  Allergies: Penicillin and pravastatin    Please refer to the intake H&P regarding the patient's review of systems, family history and social history as was done today    HEENT: Normal  Lungs: Clear to auscultation  Heart: RRR  Abdomen: Soft, nontender  Skin: clear  Extremity: She is keeping her right hip in a flexed position.  She has pain with any attempted right hip motion.  She is able to move the foot and toes well.  No redness or skin breakdown.  No numbness today.  No complaints of other extremity trauma.  Contralateral exam is normal for strength, motion, stability and neurovascular assessment.    Radiographs: Hip and femur x-rays show a right femoral neck fracture with displacement    Assessment: Displaced right femoral neck fracture.    Plan: Risk benefits of surgical intervention were discussed at length with the patient and family.  These include infection, stiffness, nerve damage, continued pain and deformity as well as need for subsequent operations.  Understanding these options limitations she is electing to proceed.  She has been seen by the medical team for optimization and we will proceed accordingly.  Likely need for postoperative rehabilitation was also noted with the patient.

## 2024-01-09 NOTE — NURSING NOTE
2005 Pt arrived to 3N 3011. Pain in R femur is tolerable when laying still.  VS stable. Pt A&Ox4. Family at bedside.   Inna (oldest sister) [phone]  Juliana (POA) [phone]    VS stable. Pt's pain was tolerable with oral oxycodone and lying still. External catheter placed for pt being on bedrest. Pt has been NPO since midnight for likely ortho surgery today.

## 2024-01-09 NOTE — OP NOTE
Hip Hemiarthroplasty (R) Operative Note    Preop diagnosis: Right femoral neck    Postop diagnosis: Same    Procedure: Right hip fracture hemiarthroplasty using a Mellisa size 12 LD fracture cemented femoral stem with 40 mm monopolar head and a 7 mm neck adapter    Surgeon: Zachariah Barros MD  Assistant: Cristina Daigle PA-C      Findings: see procedure details    EBL: minimal    Procedure Details:   Indications:  The patient had fallen injuring the right hip.  The patient was found to have a displaced femoral neck fracture.  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  Risks and benefits of hip hemiarthroplasty were noted with the patient and family.  These include infection, stiffness, nerve damage, continued pain as well as need for subsequent operations.  Informed consent was obtained prior to arrival in the operating room.    Procedure:  Upon arrival, the patient was identified. The patient was transported from a stretcher to the operating table and placed in the lateral decubitus position. All bony prominences were adequately padded.  The right hip was then prepped and draped in the usual sterile fashion. A standard posterior approach to the hip was utilized. The incision was carried through the subcutaneous tissue. Hemostasis was obtained. Fascia was incised in line with the skin incision. Fat overlying the external rotators was elevated. A T-shaped capsular incision made in the rotators with flaps tagged for later repair. The head and neck were then everted. A standard neck cut was made 1 cm above the lesser trochanter. The head and neck were removed and measured to the appropriate size. A canal finder was used to start the approach to the femur, followed by power rasping to size 3 and broaching to a tight fit. The trial component was then inserted and multiple head and neck trials were assembled to provide good stability with  full range of motion. Trial components were removed. Cement was mixed in the usual third generation fashion. The cement was inserted, followed by the fracture stem, which was held into place until all cement was fully hardened. The head and hemiarthroplasty components were then assembled and the hip was reduced. Excellent stability was again noted through a full range of motion. The wound was irrigated with sterile saline. The rotators were repaired using 0 Vicryl suture. The rotators were reattached to bone using #5 Ethibond suture placed through the bone. The fascia was closed with 0 Vicryl suture, subcutaneous tissue closed with 2-0 Vicryl, and skin was approximated with staples.  All sponge and needle counts were correct prior to closure. Sterile dressing was applied. The hip was placed in an abduction pillow and the patient was awoken from their anesthetic. The patient was taken to the recovery room in stable condition.    Cristina Daigle PA-C was present throughout the entire case. Given the nature of the disease process and the procedure, a skilled surgical first assistant was necessary during the case. The assistant was necessary to hold retractors and to manipulate the extremity during the procedure. A certified scrub tech was at the back table managing the instruments and supplies for the surgical case.    Complications:  None; patient tolerated the procedure well.    Disposition: PACU - hemodynamically stable.  Condition: stable         Zachariah Barros  Phone Number: [phone]

## 2024-01-09 NOTE — PROGRESS NOTES
01/09/24 1058   Discharge Planning   Living Arrangements Alone   Support Systems Children   Assistance Needed transport chair   Type of Residence Private residence   Number of Stairs to Enter Residence 4   Home or Post Acute Services Post acute facilities (Rehab/SNF/etc)   Type of Post Acute Facility Services Rehab   Patient expects to be discharged to: [facility]   Does the patient need discharge transport arranged? Yes   RoundTrip coordination needed? Yes   Transportation Needs   In the past 12 months, has lack of transportation kept you from medical appointments or from getting medications? no   In the past 12 months, has lack of transportation kept you from meetings, work, or from getting things needed for daily living? No   Patient Choice   Patient / Family choosing to utilize agency / facility established prior to hospitalization Yes     Met with patient and patient's daughter at bedside. Patient lives at home alone in [facility]. Patient is independent with ADLs, uses a transport chair. Patient does not drive- her daughter provides transportation. Patient to have surgery today. Pt states she prefers to go to [facility] at d/c as she has been there in the past after a stroke and after a femur fracture 5 years ago. Referral sent. Therapy evals pending. Patient will need precert.

## 2024-01-09 NOTE — ANESTHESIA PROCEDURE NOTES
Airway  Date/Time: 1/9/2024 2:27 PM  Urgency: elective    Airway not difficult    Staffing  Performed: CRNA   Authorized by: Perry Martell MD    Performed by: BEN Galvan-EBONIE  Patient location during procedure: OR    Indications and Patient Condition  Indications for airway management: anesthesia  Spontaneous ventilation: present  Sedation level: deep  Preoxygenated: yes  Patient position: sniffing  MILS maintained throughout  Mask difficulty assessment: 1 - vent by mask    Final Airway Details  Final airway type: supraglottic airway      Successful airway: Supraglottic airway: igel.  Size 3     Number of attempts at approach: 1

## 2024-01-09 NOTE — H&P
History Of Present Illness  Lona Sims is a 88 y.o. female presenting with chief complaint of hip pain after sustaining what is described as a mechanical fall.  Patient was reportedly a sending a small staircase when she lost her balance and fell approximately 2 steps, with 4 feet to the ground with the majority of impact absorbed by her right hip.  Hip pain upon arrival was reported as 5 out of 10 in severity.  There was noted range of motion restrictions involving the right hip as well as tenderness to palpation.  Imaging obtained showed evidence of right femur fracture.  Case was discussed with orthopedic service.  Patient admitted for further evaluation.     Past Medical History  Past Medical History:   Diagnosis Date    Personal history of other diseases of the musculoskeletal system and connective tissue 07/24/2014    History of polymyalgia rheumatica       Surgical History  Past Surgical History:   Procedure Laterality Date    CT HEAD ANGIO W AND WO IV CONTRAST  3/1/2019    CT HEAD ANGIO W AND WO IV CONTRAST 3/1/2019 [facility] ANCILLARY LEGACY    HIP SURGERY  08/15/2014    Hip Surgery    THYROID SURGERY  08/15/2014    Thyroid Surgery    TUBAL LIGATION  08/15/2014    Tubal Ligation        Social History  She has no history on file for tobacco use, alcohol use, and drug use.    Family History  No family history on file.     Allergies  Penicillins and Pravastatin    Review of Systems   Musculoskeletal:  Positive for joint swelling and myalgias.        Physical Exam  Vitals reviewed.   Constitutional:       Appearance: Normal appearance.   HENT:      Head: Normocephalic and atraumatic.      Nose: Nose normal.      Mouth/Throat:      Mouth: Mucous membranes are moist.   Eyes:      Extraocular Movements: Extraocular movements intact.      Conjunctiva/sclera: Conjunctivae normal.      Pupils: Pupils are equal, round, and reactive to light.   Cardiovascular:      Rate and Rhythm: Normal rate and regular rhythm.       "Pulses: Normal pulses.      Heart sounds: Normal heart sounds.   Pulmonary:      Effort: Pulmonary effort is normal.      Breath sounds: Normal breath sounds.   Abdominal:      General: Bowel sounds are normal.      Palpations: Abdomen is soft.   Musculoskeletal:         General: Swelling and tenderness present.      Cervical back: Normal range of motion and neck supple.   Skin:     General: Skin is warm and dry.   Neurological:      General: No focal deficit present.      Mental Status: She is alert. Mental status is at baseline.   Psychiatric:         Mood and Affect: Mood normal.         Behavior: Behavior normal.          Last Recorded Vitals  Blood pressure 152/77, pulse 86, temperature 37.1 °C (98.8 °F), temperature source Temporal, resp. rate 16, height 1.473 m (4' 10\"), weight (!) 36 kg (79 lb 5.9 oz), SpO2 94 %.    Relevant Results  Scheduled medications  amLODIPine, 5 mg, oral, Daily  aspirin, 81 mg, oral, Daily  atorvastatin, 40 mg, oral, Daily  heparin (porcine), 5,000 Units, subcutaneous, q8h VICTORIA  levothyroxine, 75 mcg, oral, Daily before breakfast  polyethylene glycol, 17 g, oral, Daily      Continuous medications     PRN medications  PRN medications: oxyCODONE  CT cervical spine wo IV contrast    Result Date: 1/8/2024  Interpreted By:  Doug Erwin, STUDY: CT HEAD WO IV CONTRAST; CT CERVICAL SPINE WO IV CONTRAST;  1/8/2024 7:02 pm   INDICATION: Signs/Symptoms:fall; Signs/Symptoms:Fall   COMPARISON: 08/2000   ACCESSION NUMBER(S): [id number]; [id number]   ORDERING CLINICIAN: ISA PEREZ   TECHNIQUE: Axial noncontrast CT images of head with coronal and sagittal reconstructed images. Axial noncontrast CT images of the cervical spine with coronal and sagittal reconstructed images.   FINDINGS: CT head: Global volume loss and mild chronic small vessel ischemic change. Unchanged slightly prominent subdural fluid again seen more so on the left. No evidence of acute hemorrhage. No mass effect or " midline shift   Minimal ethmoid sinus mucosal thickening. Vascular calcification   Cervical spine: Scattered multilevel degenerative disc disease. Degree is mild. No evidence of acute cervical spine fracture. Severe vascular calcification. Features suggesting mild edema       Senescent changes. No evidence of acute cervical spine fracture.   Volume loss and chronic small vessel ischemic change seen within the brain. No evidence of acute intracranial hemorrhage.   Signed by: Doug Erwin 1/8/2024 8:04 PM Dictation workstation:   [id number]    CT head wo IV contrast    Result Date: 1/8/2024  Interpreted By:  Doug Erwin, STUDY: CT HEAD WO IV CONTRAST; CT CERVICAL SPINE WO IV CONTRAST;  1/8/2024 7:02 pm   INDICATION: Signs/Symptoms:fall; Signs/Symptoms:Fall   COMPARISON: 08/2000   ACCESSION NUMBER(S): [id number]; [id number]   ORDERING CLINICIAN: ISA PEREZ   TECHNIQUE: Axial noncontrast CT images of head with coronal and sagittal reconstructed images. Axial noncontrast CT images of the cervical spine with coronal and sagittal reconstructed images.   FINDINGS: CT head: Global volume loss and mild chronic small vessel ischemic change. Unchanged slightly prominent subdural fluid again seen more so on the left. No evidence of acute hemorrhage. No mass effect or midline shift   Minimal ethmoid sinus mucosal thickening. Vascular calcification   Cervical spine: Scattered multilevel degenerative disc disease. Degree is mild. No evidence of acute cervical spine fracture. Severe vascular calcification. Features suggesting mild edema       Senescent changes. No evidence of acute cervical spine fracture.   Volume loss and chronic small vessel ischemic change seen within the brain. No evidence of acute intracranial hemorrhage.   Signed by: Doug Erwin 1/8/2024 8:04 PM Dictation workstation:   [id number]    XR chest 1 view    Result Date: 1/8/2024  Interpreted By:  Seth Durant, STUDY: XR CHEST 1 VIEW;   1/8/2024 6:38 pm   INDICATION: Signs/Symptoms:fall.   COMPARISON: 02/19/2014   ACCESSION NUMBER(S): [id number]   ORDERING CLINICIAN: ISAIAH MEZA   FINDINGS:   The cardiac silhouette is unremarkable. Costophrenic angles are sharp. Chronic interstitial opacities are noted throughout the bilateral lungs. No definite focal airspace disease is seen. There are calcified granulomas in the right upper lung. The trachea is midline. There is no pneumothorax. No acute osseous abnormality is seen.       1.  No active cardiopulmonary process.     Signed by: Seth Durant 1/8/2024 6:44 PM Dictation workstation:   [id number]    XR femur right 2+ views    Result Date: 1/8/2024  Interpreted By:  Seth Durant, STUDY: XR FEMUR RIGHT 2+ VIEWS; ;  1/8/2024 6:29 pm   INDICATION: Signs/Symptoms:Fall.   COMPARISON: None.   ACCESSION NUMBER(S): [id number]   ORDERING CLINICIAN: ISA PEREZ   FINDINGS: Please see the impression       Displaced subcapital/mid cervical fracture of the right femur.   Moderate to severe osteoarthritis of the right hip and knee joints.   Mild diffuse osteopenia.     MACRO: None   Signed by: Seth Durant 1/8/2024 6:43 PM Dictation workstation:   [id number]    XR hip right with pelvis when performed 2 or 3 views    Result Date: 1/8/2024  Interpreted By:  Seth Durant, STUDY: XR HIP RIGHT WITH PELVIS WHEN PERFORMED 2 OR 3 VIEWS; ;  1/8/2024 6:29 pm   INDICATION: Signs/Symptoms:Fall.   COMPARISON: None.   ACCESSION NUMBER(S): [id number]   ORDERING CLINICIAN: ISA PEREZ   FINDINGS: Please see the impression       Displaced subcapital/mid cervical fracture of the right femur.   Moderate-to-severe osteoarthritis of the bilateral hip joints.   Multilevel lumbar spondylosis     MACRO: None   Signed by: Seth Durant 1/8/2024 6:43 PM Dictation workstation:   [id number]   Results for orders placed or performed during the hospital encounter of 01/08/24 (from the past 24 hour(s))   CBC   Result Value  Ref Range    WBC 6.0 4.4 - 11.3 x10*3/uL    nRBC 0.0 0.0 - 0.0 /100 WBCs    RBC 4.88 4.00 - 5.20 x10*6/uL    Hemoglobin 14.6 12.0 - 16.0 g/dL    Hematocrit 44.4 36.0 - 46.0 %    MCV 91 80 - 100 fL    MCH 29.9 26.0 - 34.0 pg    MCHC 32.9 32.0 - 36.0 g/dL    RDW 13.2 11.5 - 14.5 %    Platelets 199 150 - 450 x10*3/uL   Comprehensive metabolic panel   Result Value Ref Range    Glucose 119 (H) 74 - 99 mg/dL    Sodium 136 136 - 145 mmol/L    Potassium 4.6 3.5 - 5.3 mmol/L    Chloride 101 98 - 107 mmol/L    Bicarbonate 27 21 - 32 mmol/L    Anion Gap 13 10 - 20 mmol/L    Urea Nitrogen 12 6 - 23 mg/dL    Creatinine 0.66 0.50 - 1.05 mg/dL    eGFR 84 >60 mL/min/1.73m*2    Calcium 9.3 8.6 - 10.3 mg/dL    Albumin 4.1 3.4 - 5.0 g/dL    Alkaline Phosphatase 98 33 - 136 U/L    Total Protein 8.2 6.4 - 8.2 g/dL    AST 68 (H) 9 - 39 U/L    Bilirubin, Total 0.6 0.0 - 1.2 mg/dL    ALT 40 7 - 45 U/L   Type and Screen   Result Value Ref Range    ABO TYPE B     Rh TYPE POS     ANTIBODY SCREEN NEG    Green Top   Result Value Ref Range    Extra Tube Hold for add-ons.    Coagulation Screen   Result Value Ref Range    Protime 12.5 9.8 - 12.8 seconds    INR 1.1 0.9 - 1.1    aPTT 34 27 - 38 seconds       Assessment/Plan   Principal Problem:    Oth fracture of unsp femur, init encntr for closed fracture (CMS/[facility])  Active Problems:    Closed fracture of neck of right femur with malunion    Weakness    Ambulatory dysfunction      Patient presents for definitive management after sustaining what is described as a mechanical fall with subsequent right femur fracture.  Case has been discussed with orthopedic service.  Patient will remain n.p.o. after midnight.  Surgical planning and recommendations pending their assessment in AM.    Perioperative risk assessment performed.  Procedure not considered to be elevated risk category.  Patient has no documented history of coronary artery disease or congestive heart disease.  Does endorse a history of  hypertension, hypothyroidism, as well as dyslipidemia.  No prior cardiac intervention including PCI.  She has no documented history of TIA.  She is not insulin-dependent.  Preoperative creatinine <2.  RCRI of 0 points, class I risk.  Patient will require telemetry pre and postoperatively.  Otherwise represents an acceptable risk to proceed given the high functional status previously noted.    Supportive measures including gentle IV fluids, Zofran as needed for nausea, acetaminophen for mild to moderate pain.  Oxycodone has been made available for more severe pain as reported.    Home medications reviewed and resumed as indicated    Pharmacologic anticoagulation with heparin.  A.m. dose to be held preoperatively.  To be resumed postoperatively pending surgical clearance.    PT/OT evaluation     I spent >75 minutes in the professional and overall care of this patient.      Lenny Leigh, DO

## 2024-01-09 NOTE — ANESTHESIA PREPROCEDURE EVALUATION
Patient: Lona Sims    Procedure Information       Date/Time: 01/09/24 1415    Procedure: Hip Hemiarthroplasty (Right: Hip)    Location: [facility] OR 01 / [facility]    Surgeons: Zachariah Barros MD            Relevant Problems   Cardiovascular   (+) Hyperlipidemia   (+) Hypertension      Endocrine   (+) Hypothyroidism      GI   (+) Esophageal reflux      Neuro/Psych   (+) Stroke (CMS/HCC)       Clinical information reviewed:   Tobacco  Allergies  Meds  Problems  Med Hx  Surg Hx   Fam Hx  Soc   Hx        NPO Detail:  No data recorded     Physical Exam    Airway  Mallampati: III  TM distance: >3 FB  Neck ROM: full     Cardiovascular   Rhythm: regular  Rate: normal     Dental   (+) upper dentures, lower dentures     Pulmonary   Breath sounds clear to auscultation  (+) decreased breath sounds     Abdominal   Abdomen: soft  Bowel sounds: normal     Other findings: Small mouth opening, edentulous.          Anesthesia Plan    History of general anesthesia?: yes  History of complications of general anesthesia?: no    ASA 4     general     The patient is not a current smoker.  Patient was previously instructed to abstain from smoking on day of procedure.  Patient did not smoke on day of procedure.  Education provided regarding risk of obstructive sleep apnea.  intravenous induction   Postoperative administration of opioids is intended.  Anesthetic plan and risks discussed with patient.  Use of blood products discussed with patient who consented to blood products.    Plan discussed with CAA and CRNA.

## 2024-01-10 PROBLEM — R19.7 DIARRHEA: Status: ACTIVE | Noted: 2024-01-10

## 2024-01-10 PROBLEM — I69.351 HEMIPARESIS AFFECTING RIGHT SIDE AS LATE EFFECT OF CEREBROVASCULAR ACCIDENT (CVA) (MULTI): Status: ACTIVE | Noted: 2024-01-10

## 2024-01-10 PROBLEM — M25.519 SHOULDER JOINT PAIN: Status: ACTIVE | Noted: 2024-01-10

## 2024-01-10 PROBLEM — R01.1 HEART MURMUR: Status: ACTIVE | Noted: 2024-01-10

## 2024-01-10 PROBLEM — H35.051: Status: ACTIVE | Noted: 2024-01-10

## 2024-01-10 PROBLEM — I69.322 DYSARTHRIA DUE TO RECENT CEREBROVASCULAR ACCIDENT (CVA): Status: ACTIVE | Noted: 2024-01-10

## 2024-01-10 PROBLEM — I35.1 MILD AORTIC VALVE REGURGITATION: Status: ACTIVE | Noted: 2024-01-10

## 2024-01-10 PROBLEM — M81.0 SENILE OSTEOPOROSIS: Status: ACTIVE | Noted: 2024-01-10

## 2024-01-10 PROBLEM — H40.059 OCULAR HYPERTENSION: Status: ACTIVE | Noted: 2024-01-10

## 2024-01-10 PROBLEM — M19.049 OSTEOARTHROSIS OF HAND: Status: ACTIVE | Noted: 2024-01-10

## 2024-01-10 PROBLEM — I63.342: Status: ACTIVE | Noted: 2024-01-10

## 2024-01-10 PROBLEM — E78.5 DYSLIPIDEMIA: Status: ACTIVE | Noted: 2024-01-10

## 2024-01-10 PROBLEM — H35.3290 EXUDATIVE AGE-RELATED MACULAR DEGENERATION (MULTI): Status: ACTIVE | Noted: 2024-01-10

## 2024-01-10 PROBLEM — R42 DIZZINESS AND GIDDINESS: Status: ACTIVE | Noted: 2024-01-10

## 2024-01-10 PROBLEM — F09 MILD COGNITIVE DISORDER: Status: ACTIVE | Noted: 2024-01-10

## 2024-01-10 PROBLEM — M15.9 GENERALIZED OSTEOARTHRITIS: Status: ACTIVE | Noted: 2024-01-10

## 2024-01-10 PROBLEM — R11.2 NAUSEA AND VOMITING: Status: ACTIVE | Noted: 2024-01-10

## 2024-01-10 PROBLEM — I35.0 AORTIC VALVE STENOSIS, MILD: Status: ACTIVE | Noted: 2024-01-10

## 2024-01-10 PROBLEM — N39.0 URINARY TRACT INFECTIOUS DISEASE: Status: ACTIVE | Noted: 2024-01-10

## 2024-01-10 LAB
ANION GAP SERPL CALC-SCNC: 10 MMOL/L (ref 10–20)
ANION GAP SERPL CALC-SCNC: 13 MMOL/L (ref 10–20)
BUN SERPL-MCNC: 17 MG/DL (ref 6–23)
BUN SERPL-MCNC: 19 MG/DL (ref 6–23)
CALCIUM SERPL-MCNC: 7.8 MG/DL (ref 8.6–10.3)
CALCIUM SERPL-MCNC: 8.1 MG/DL (ref 8.6–10.3)
CHLORIDE SERPL-SCNC: 101 MMOL/L (ref 98–107)
CHLORIDE SERPL-SCNC: 97 MMOL/L (ref 98–107)
CO2 SERPL-SCNC: 24 MMOL/L (ref 21–32)
CO2 SERPL-SCNC: 30 MMOL/L (ref 21–32)
CREAT SERPL-MCNC: 0.69 MG/DL (ref 0.5–1.05)
CREAT SERPL-MCNC: 0.74 MG/DL (ref 0.5–1.05)
EGFRCR SERPLBLD CKD-EPI 2021: 78 ML/MIN/1.73M*2
EGFRCR SERPLBLD CKD-EPI 2021: 84 ML/MIN/1.73M*2
ERYTHROCYTE [DISTWIDTH] IN BLOOD BY AUTOMATED COUNT: 13.2 % (ref 11.5–14.5)
ERYTHROCYTE [DISTWIDTH] IN BLOOD BY AUTOMATED COUNT: 13.2 % (ref 11.5–14.5)
GLUCOSE SERPL-MCNC: 106 MG/DL (ref 74–99)
GLUCOSE SERPL-MCNC: 149 MG/DL (ref 74–99)
HCT VFR BLD AUTO: 25.3 % (ref 36–46)
HCT VFR BLD AUTO: 33.2 % (ref 36–46)
HGB BLD-MCNC: 10.3 G/DL (ref 12–16)
HGB BLD-MCNC: 8 G/DL (ref 12–16)
MCH RBC QN AUTO: 29.9 PG (ref 26–34)
MCH RBC QN AUTO: 30.1 PG (ref 26–34)
MCHC RBC AUTO-ENTMCNC: 31 G/DL (ref 32–36)
MCHC RBC AUTO-ENTMCNC: 31.6 G/DL (ref 32–36)
MCV RBC AUTO: 94 FL (ref 80–100)
MCV RBC AUTO: 97 FL (ref 80–100)
NRBC BLD-RTO: 0 /100 WBCS (ref 0–0)
NRBC BLD-RTO: 0 /100 WBCS (ref 0–0)
PLATELET # BLD AUTO: 128 X10*3/UL (ref 150–450)
PLATELET # BLD AUTO: 129 X10*3/UL (ref 150–450)
POTASSIUM SERPL-SCNC: 4.3 MMOL/L (ref 3.5–5.3)
POTASSIUM SERPL-SCNC: 4.7 MMOL/L (ref 3.5–5.3)
RBC # BLD AUTO: 2.68 X10*6/UL (ref 4–5.2)
RBC # BLD AUTO: 3.42 X10*6/UL (ref 4–5.2)
SODIUM SERPL-SCNC: 133 MMOL/L (ref 136–145)
SODIUM SERPL-SCNC: 133 MMOL/L (ref 136–145)
WBC # BLD AUTO: 10.8 X10*3/UL (ref 4.4–11.3)
WBC # BLD AUTO: 13.1 X10*3/UL (ref 4.4–11.3)

## 2024-01-10 PROCEDURE — 2500000001 HC RX 250 WO HCPCS SELF ADMINISTERED DRUGS (ALT 637 FOR MEDICARE OP): Performed by: PHYSICIAN ASSISTANT

## 2024-01-10 PROCEDURE — 85027 COMPLETE CBC AUTOMATED: CPT | Performed by: ORTHOPAEDIC SURGERY

## 2024-01-10 PROCEDURE — 85027 COMPLETE CBC AUTOMATED: CPT | Performed by: STUDENT IN AN ORGANIZED HEALTH CARE EDUCATION/TRAINING PROGRAM

## 2024-01-10 PROCEDURE — 80048 BASIC METABOLIC PNL TOTAL CA: CPT | Performed by: ORTHOPAEDIC SURGERY

## 2024-01-10 PROCEDURE — 36415 COLL VENOUS BLD VENIPUNCTURE: CPT | Performed by: STUDENT IN AN ORGANIZED HEALTH CARE EDUCATION/TRAINING PROGRAM

## 2024-01-10 PROCEDURE — 97161 PT EVAL LOW COMPLEX 20 MIN: CPT | Mod: GP

## 2024-01-10 PROCEDURE — 99233 SBSQ HOSP IP/OBS HIGH 50: CPT | Performed by: PHYSICIAN ASSISTANT

## 2024-01-10 PROCEDURE — 2500000004 HC RX 250 GENERAL PHARMACY W/ HCPCS (ALT 636 FOR OP/ED): Performed by: ORTHOPAEDIC SURGERY

## 2024-01-10 PROCEDURE — 2500000001 HC RX 250 WO HCPCS SELF ADMINISTERED DRUGS (ALT 637 FOR MEDICARE OP): Performed by: ORTHOPAEDIC SURGERY

## 2024-01-10 PROCEDURE — 1100000001 HC PRIVATE ROOM DAILY

## 2024-01-10 PROCEDURE — 97165 OT EVAL LOW COMPLEX 30 MIN: CPT | Mod: GO

## 2024-01-10 PROCEDURE — 99232 SBSQ HOSP IP/OBS MODERATE 35: CPT | Performed by: STUDENT IN AN ORGANIZED HEALTH CARE EDUCATION/TRAINING PROGRAM

## 2024-01-10 PROCEDURE — 36415 COLL VENOUS BLD VENIPUNCTURE: CPT | Performed by: ORTHOPAEDIC SURGERY

## 2024-01-10 PROCEDURE — 80048 BASIC METABOLIC PNL TOTAL CA: CPT | Performed by: STUDENT IN AN ORGANIZED HEALTH CARE EDUCATION/TRAINING PROGRAM

## 2024-01-10 PROCEDURE — 2500000001 HC RX 250 WO HCPCS SELF ADMINISTERED DRUGS (ALT 637 FOR MEDICARE OP): Performed by: STUDENT IN AN ORGANIZED HEALTH CARE EDUCATION/TRAINING PROGRAM

## 2024-01-10 RX ORDER — LEVOTHYROXINE SODIUM 75 UG/1
75 TABLET ORAL
Status: DISCONTINUED | OUTPATIENT
Start: 2024-01-11 | End: 2024-01-13 | Stop reason: HOSPADM

## 2024-01-10 RX ORDER — OXYCODONE HYDROCHLORIDE 5 MG/1
5 TABLET ORAL EVERY 6 HOURS PRN
Status: DISCONTINUED | OUTPATIENT
Start: 2024-01-10 | End: 2024-01-13 | Stop reason: HOSPADM

## 2024-01-10 RX ORDER — ASPIRIN 81 MG/1
81 TABLET ORAL DAILY
Status: DISCONTINUED | OUTPATIENT
Start: 2024-01-10 | End: 2024-01-13 | Stop reason: HOSPADM

## 2024-01-10 RX ORDER — AMLODIPINE BESYLATE 5 MG/1
5 TABLET ORAL DAILY
Status: DISCONTINUED | OUTPATIENT
Start: 2024-01-10 | End: 2024-01-13 | Stop reason: HOSPADM

## 2024-01-10 RX ORDER — CEPHALEXIN 500 MG/1
500 CAPSULE ORAL EVERY 8 HOURS SCHEDULED
Status: DISCONTINUED | OUTPATIENT
Start: 2024-01-10 | End: 2024-01-11

## 2024-01-10 RX ORDER — ACETAMINOPHEN 325 MG/1
650 TABLET ORAL EVERY 4 HOURS PRN
Status: DISCONTINUED | OUTPATIENT
Start: 2024-01-10 | End: 2024-01-10

## 2024-01-10 RX ORDER — ATORVASTATIN CALCIUM 40 MG/1
40 TABLET, FILM COATED ORAL NIGHTLY
Status: DISCONTINUED | OUTPATIENT
Start: 2024-01-10 | End: 2024-01-13 | Stop reason: HOSPADM

## 2024-01-10 RX ADMIN — ACETAMINOPHEN 650 MG: 325 TABLET ORAL at 06:11

## 2024-01-10 RX ADMIN — ATORVASTATIN CALCIUM 40 MG: 40 TABLET, FILM COATED ORAL at 21:04

## 2024-01-10 RX ADMIN — CEPHALEXIN 500 MG: 500 CAPSULE ORAL at 14:48

## 2024-01-10 RX ADMIN — ACETAMINOPHEN 650 MG: 325 TABLET ORAL at 18:23

## 2024-01-10 RX ADMIN — CEPHALEXIN 500 MG: 500 CAPSULE ORAL at 21:04

## 2024-01-10 RX ADMIN — OXYCODONE HYDROCHLORIDE 5 MG: 5 TABLET ORAL at 14:48

## 2024-01-10 RX ADMIN — AMLODIPINE BESYLATE 5 MG: 5 TABLET ORAL at 14:48

## 2024-01-10 RX ADMIN — ACETAMINOPHEN 650 MG: 325 TABLET ORAL at 00:55

## 2024-01-10 RX ADMIN — ACETAMINOPHEN 650 MG: 325 TABLET ORAL at 23:33

## 2024-01-10 RX ADMIN — SODIUM CHLORIDE, POTASSIUM CHLORIDE, SODIUM LACTATE AND CALCIUM CHLORIDE 50 ML/HR: 600; 310; 30; 20 INJECTION, SOLUTION INTRAVENOUS at 06:11

## 2024-01-10 RX ADMIN — DOCUSATE SODIUM 100 MG: 100 CAPSULE, LIQUID FILLED ORAL at 21:04

## 2024-01-10 RX ADMIN — CEFAZOLIN SODIUM 2 G: 2 INJECTION, SOLUTION INTRAVENOUS at 06:11

## 2024-01-10 RX ADMIN — HYDROMORPHONE HYDROCHLORIDE 0.5 MG: 1 INJECTION, SOLUTION INTRAMUSCULAR; INTRAVENOUS; SUBCUTANEOUS at 16:35

## 2024-01-10 RX ADMIN — ENOXAPARIN SODIUM 40 MG: 40 INJECTION SUBCUTANEOUS at 09:27

## 2024-01-10 RX ADMIN — OXYCODONE HYDROCHLORIDE 10 MG: 10 TABLET ORAL at 06:54

## 2024-01-10 ASSESSMENT — COGNITIVE AND FUNCTIONAL STATUS - GENERAL
TURNING FROM BACK TO SIDE WHILE IN FLAT BAD: TOTAL
HELP NEEDED FOR BATHING: A LOT
MOBILITY SCORE: 7
MOBILITY SCORE: 7
TOILETING: A LOT
MOVING FROM LYING ON BACK TO SITTING ON SIDE OF FLAT BED WITH BEDRAILS: A LOT
WALKING IN HOSPITAL ROOM: TOTAL
TOILETING: A LOT
EATING MEALS: A LITTLE
MOVING TO AND FROM BED TO CHAIR: TOTAL
CLIMB 3 TO 5 STEPS WITH RAILING: TOTAL
MOVING FROM LYING ON BACK TO SITTING ON SIDE OF FLAT BED WITH BEDRAILS: A LOT
PERSONAL GROOMING: A LITTLE
EATING MEALS: A LITTLE
PERSONAL GROOMING: A LITTLE
MOVING TO AND FROM BED TO CHAIR: TOTAL
WALKING IN HOSPITAL ROOM: TOTAL
WALKING IN HOSPITAL ROOM: TOTAL
DAILY ACTIVITIY SCORE: 16
STANDING UP FROM CHAIR USING ARMS: TOTAL
HELP NEEDED FOR BATHING: A LOT
PERSONAL GROOMING: A LITTLE
DRESSING REGULAR LOWER BODY CLOTHING: A LOT
PERSONAL GROOMING: A LITTLE
DRESSING REGULAR LOWER BODY CLOTHING: TOTAL
MOVING TO AND FROM BED TO CHAIR: TOTAL
HELP NEEDED FOR BATHING: A LOT
MOBILITY SCORE: 7
DRESSING REGULAR UPPER BODY CLOTHING: A LOT
STANDING UP FROM CHAIR USING ARMS: TOTAL
EATING MEALS: A LITTLE
DRESSING REGULAR UPPER BODY CLOTHING: A LOT
TOILETING: A LITTLE
TOILETING: A LOT
EATING MEALS: A LITTLE
DRESSING REGULAR UPPER BODY CLOTHING: A LITTLE
DAILY ACTIVITIY SCORE: 15
STANDING UP FROM CHAIR USING ARMS: TOTAL
TURNING FROM BACK TO SIDE WHILE IN FLAT BAD: TOTAL
TURNING FROM BACK TO SIDE WHILE IN FLAT BAD: TOTAL
DRESSING REGULAR UPPER BODY CLOTHING: A LITTLE
HELP NEEDED FOR BATHING: A LOT
DRESSING REGULAR LOWER BODY CLOTHING: A LOT
DAILY ACTIVITIY SCORE: 13
DRESSING REGULAR LOWER BODY CLOTHING: TOTAL
DAILY ACTIVITIY SCORE: 13
CLIMB 3 TO 5 STEPS WITH RAILING: TOTAL
CLIMB 3 TO 5 STEPS WITH RAILING: TOTAL
MOVING FROM LYING ON BACK TO SITTING ON SIDE OF FLAT BED WITH BEDRAILS: A LOT

## 2024-01-10 ASSESSMENT — PAIN DESCRIPTION - LOCATION
LOCATION: HIP
LOCATION: OTHER (COMMENT)

## 2024-01-10 ASSESSMENT — ACTIVITIES OF DAILY LIVING (ADL): ADL_ASSISTANCE: INDEPENDENT

## 2024-01-10 ASSESSMENT — PAIN DESCRIPTION - ORIENTATION
ORIENTATION: RIGHT
ORIENTATION: RIGHT

## 2024-01-10 ASSESSMENT — PAIN - FUNCTIONAL ASSESSMENT
PAIN_FUNCTIONAL_ASSESSMENT: 0-10

## 2024-01-10 ASSESSMENT — PAIN SCALES - GENERAL
PAINLEVEL_OUTOF10: 10 - WORST POSSIBLE PAIN
PAINLEVEL_OUTOF10: 0 - NO PAIN
PAINLEVEL_OUTOF10: 0 - NO PAIN
PAINLEVEL_OUTOF10: 10 - WORST POSSIBLE PAIN
PAINLEVEL_OUTOF10: 0 - NO PAIN
PAINLEVEL_OUTOF10: 3
PAINLEVEL_OUTOF10: 3
PAINLEVEL_OUTOF10: 2

## 2024-01-10 NOTE — PROGRESS NOTES
Therapy evals are complete. Request sent to the direct precert team to start precert for [facility]. Pt and daughter updated at bedside.     Leeann Nelson RN

## 2024-01-10 NOTE — CARE PLAN
Problem: Pain - Adult  Goal: Verbalizes/displays adequate comfort level or baseline comfort level  Outcome: Progressing   The patient's goals for the shift include      The clinical goals for the shift include   Problem: Pain  Goal: Turns in bed with improved pain control throughout the shift  Outcome: Progressing     Problem: Fall/Injury  Goal: Verbalize understanding of risk factor reduction measures to prevent injury from fall in the home  Outcome: Progressing

## 2024-01-10 NOTE — PROGRESS NOTES
Physical Therapy    Physical Therapy    Physical Therapy Evaluation & Treatment    Patient Name: Lona Sims  MRN: [id number]  Today's Date: 1/10/2024   Time Calculation  Start Time: 0908  Stop Time: 0928  Time Calculation (min): 20 min    Assessment/Plan   PT Assessment  PT Assessment Results: Decreased strength, Decreased endurance, Impaired balance, Decreased mobility, Decreased safety awareness  Rehab Prognosis: Fair  End of Session Communication: Bedside nurse  End of Session Patient Position: Up in chair, Alarm on  IP OR SWING BED PT PLAN  Inpatient or Swing Bed: Inpatient  PT Plan  Treatment/Interventions: Bed mobility, Transfer training, Gait training, Balance training, Strengthening, Endurance training, Therapeutic exercise, Therapeutic activity (Pt education)  PT Plan: Skilled PT  PT Frequency: Daily  PT Discharge Recommendations: Moderate intensity level of continued care  PT Recommended Transfer Status: Assist x2  PT - OK to Discharge: OK to discharge from acute PT services to the next level of care when cleared by the medical team.    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)    Diarrhea    Dizziness and giddiness    Dysarthria due to recent cerebrovascular accident (CVA)    Dyslipidemia    Generalized osteoarthritis    Heart murmur    Hemiparesis affecting right side as late effect of cerebrovascular accident (CVA) (CMS/HCC)    Mild  aortic valve regurgitation    Mild cognitive disorder    Nausea and vomiting    Ocular hypertension    Osteoarthrosis of hand    Retinal neovascularization of right eye    Senile osteoporosis    Shoulder joint pain    Urinary tract infectious disease       Subjective     General Visit Information:  General  Reason for Referral: Difficulty walking.  Pt admitted 1/9/24 s/p fall with R femur fracture.  1/9/24 s/p R hip hemiarthroplasty.  Referred By: Dr. Mcqueen  Co-Treatment: OT  Co-Treatment Reason: Co-treatment to maximize functional independence and for safety.  Prior to Session Communication: Bedside nurse  Patient Position Received: Bed, 3 rail up    Home Living:  Home Living  Home Adaptive Equipment: Quad cane (transport chair)  Home Living Comments: Pt resides in a mobile home with 4 stairs to enter with handrail, walk in shower with seat and grab bar.    Prior Level of Function:  Prior Function Per Pt/Caregiver Report  Prior Function Comments: Pt uses a transport chair or a standard walker.  Pt reports she has handrails in the hallway and bathroom.  Pt is independent with ADL's, cooking and cleaning.  Daughter completes the driving.    Precautions:  Precautions  LE Weight Bearing Status: Weight Bearing as Tolerated  Medical Precautions: Fall precautions  Post-Surgical Precautions: Right hip precautions         Objective     Pain:  Pain Assessment  Pain Assessment: 0-10  Pain Score: 3  Pain Type: Surgical pain  Pain Location: Hip  Pain Orientation: Right    Cognition:  Cognition  Overall Cognitive Status: Within Functional Limits    General Assessments:         Sensation  Sensation Comment: Pt denies numbness and tingling.                      Functional Assessments:     Bed Mobility  Bed Mobility:  (supine to sit max assist of 2.)  Transfers  Transfer:  (sit<>stand max assist of 2)  Ambulation/Gait Training  Ambulation/Gait Training Performed:  (Pt took several steps from bed to chair with max assist of 2 and L  hand held assist.)          Extremity/Trunk Assessments:  RUE   RUE :  (R UE flexion contractures)        LLE   LLE :  (R knee and ankle WFL.)    Treatments:  Pt instructed on and performed bed mobility with max cues for technique; transfers with max cues for hand placement and technique; gait with max cues to avoid pivoting on surgical leg.  Pt educated on total hip precautions.         Outcome Measures:                               Goals:  Encounter Problems       Encounter Problems (Active)       PT Problem       PT Goal 1 (Progressing)       Start:  01/10/24    Expected End:  01/24/24       Pt able to perform bed mobility with mod assist.           PT Goal 2 (Progressing)       Start:  01/10/24    Expected End:  01/24/24       Pt able to complete all transfers with mod assist.            PT Goal 3 (Progressing)       Start:  01/10/24    Expected End:  01/24/24       Pt able to ambulate 20 feet with quad cane and mod assist of 2.                  Education Documentation  Precautions, taught by Clemencia Milian PT at 1/10/2024  1:20 PM.  Learner: Patient  Readiness: Acceptance  Method: Explanation  Response: Needs Reinforcement    Mobility Training, taught by Clemencia Milian, PT at 1/10/2024  1:20 PM.  Learner: Patient  Readiness: Acceptance  Method: Explanation  Response: Needs Reinforcement    Education Comments  No comments found.

## 2024-01-10 NOTE — PROGRESS NOTES
Lona Sims is a 88 y.o. female on day 2 of admission presenting with Oth fracture of unsp femur, init encntr for closed fracture (CMS/[facility]).      Subjective   Patient underwent R hip hemiarthroplasty with Dr Barros yesterday. She denies pain this AM. Denies dyspnea, chest pain.        Objective     Last Recorded Vitals  /53 (BP Location: Left arm, Patient Position: Lying)   Pulse 80   Temp 37.1 °C (98.8 °F) (Temporal)   Resp 18   Wt (!) 36 kg (79 lb 5.9 oz)   SpO2 99%   Intake/Output last 3 Shifts:    Intake/Output Summary (Last 24 hours) at 1/10/2024 1429  Last data filed at 1/10/2024 1026  Gross per 24 hour   Intake 1666.39 ml   Output 75 ml   Net 1591.39 ml       Admission Weight  Weight: (!) 36 kg (79 lb 5.9 oz) (01/08/24 1707)    Daily Weight  01/08/24 : (!) 36 kg (79 lb 5.9 oz)      Physical Exam  Gen: frail, no acute distress  HEENT: MMM, EOMI, no scleral icterus  Neck: supple, no LAD  CV: RRR, II/VI systolic ejection murmur  Lungs: good air entry b/l, no rhonchi/wheezes appreciated  Abd: soft, nontender, nondistended, normoactive BS  Ext: surgical site dressed, no surrounding erythema, distal pulses intact  Skin: no rashes/lesions  Neuro: Cns III-XII grossly intact, alert and oriented x3      Relevant Results             Scheduled medications  acetaminophen, 650 mg, oral, q6h VICTORIA  amLODIPine, 5 mg, oral, Daily  [Held by provider] aspirin, 81 mg, oral, Daily  atorvastatin, 40 mg, oral, Daily  cephalexin, 500 mg, oral, q8h VICTORIA  docusate sodium, 100 mg, oral, BID  [Held by provider] enoxaparin, 40 mg, subcutaneous, Daily  [START ON 1/11/2024] levothyroxine, 75 mcg, oral, Daily before breakfast      Continuous medications  lactated Ringer's, 50 mL/hr, Last Rate: 50 mL/hr (01/10/24 0611)      PRN medications  PRN medications: benzocaine-menthol, bisacodyl, cyclobenzaprine, diphenhydrAMINE, HYDROmorphone, morphine, naloxone, naloxone, naloxone, ondansetron ODT **OR** ondansetron, oxyCODONE,  prochlorperazine **OR** prochlorperazine **OR** prochlorperazine    Results for orders placed or performed during the hospital encounter of 01/08/24 (from the past 24 hour(s))   CBC   Result Value Ref Range    WBC 10.8 4.4 - 11.3 x10*3/uL    nRBC 0.0 0.0 - 0.0 /100 WBCs    RBC 3.42 (L) 4.00 - 5.20 x10*6/uL    Hemoglobin 10.3 (L) 12.0 - 16.0 g/dL    Hematocrit 33.2 (L) 36.0 - 46.0 %    MCV 97 80 - 100 fL    MCH 30.1 26.0 - 34.0 pg    MCHC 31.0 (L) 32.0 - 36.0 g/dL    RDW 13.2 11.5 - 14.5 %    Platelets 128 (L) 150 - 450 x10*3/uL   Basic metabolic panel   Result Value Ref Range    Glucose 106 (H) 74 - 99 mg/dL    Sodium 133 (L) 136 - 145 mmol/L    Potassium 4.7 3.5 - 5.3 mmol/L    Chloride 101 98 - 107 mmol/L    Bicarbonate 24 21 - 32 mmol/L    Anion Gap 13 10 - 20 mmol/L    Urea Nitrogen 19 6 - 23 mg/dL    Creatinine 0.69 0.50 - 1.05 mg/dL    eGFR 84 >60 mL/min/1.73m*2    Calcium 7.8 (L) 8.6 - 10.3 mg/dL       XR hip right with pelvis when performed 2 or 3 views    Result Date: 1/9/2024  Interpreted By:  Gregg Gonsales, STUDY: XR HIP RIGHT WITH PELVIS WHEN PERFORMED 2 OR 3 VIEWS; ;  1/9/2024 4:21 pm   INDICATION: Signs/Symptoms:post op R hip jenny in RR (AP/lat).   COMPARISON: Right hip radiographs dated 01/08/2024.   ACCESSION NUMBER(S): [id number]   ORDERING CLINICIAN: CONCEPCION PALOMARES   FINDINGS: AP and cross-table view of the right hip. Interval postoperative change of right total hip arthroplasty. Arthroplasty components appear intact with near-anatomic alignment. There is no periprosthetic lucency. There is no interval acute fracture. Left dynamic hip pin screw is unchanged in appearance. Postoperative gas and soft tissue swelling surrounding the right hip joint. Skin staples noted.       Postoperative change of right hip arthroplasty with no radiographic apparent hardware complication or acute osseous abnormality.   Unchanged appearance of previous left dynamic hip pin screw.   MACRO: None   Signed by: Gregg  Hobohm 1/9/2024 4:29 PM Dictation workstation:   [id number]    CT cervical spine wo IV contrast    Result Date: 1/8/2024  Interpreted By:  Doug Erwin, STUDY: CT HEAD WO IV CONTRAST; CT CERVICAL SPINE WO IV CONTRAST;  1/8/2024 7:02 pm   INDICATION: Signs/Symptoms:fall; Signs/Symptoms:Fall   COMPARISON: 08/2000   ACCESSION NUMBER(S): [id number]; [id number]   ORDERING CLINICIAN: ISA PEREZ   TECHNIQUE: Axial noncontrast CT images of head with coronal and sagittal reconstructed images. Axial noncontrast CT images of the cervical spine with coronal and sagittal reconstructed images.   FINDINGS: CT head: Global volume loss and mild chronic small vessel ischemic change. Unchanged slightly prominent subdural fluid again seen more so on the left. No evidence of acute hemorrhage. No mass effect or midline shift   Minimal ethmoid sinus mucosal thickening. Vascular calcification   Cervical spine: Scattered multilevel degenerative disc disease. Degree is mild. No evidence of acute cervical spine fracture. Severe vascular calcification. Features suggesting mild edema       Senescent changes. No evidence of acute cervical spine fracture.   Volume loss and chronic small vessel ischemic change seen within the brain. No evidence of acute intracranial hemorrhage.   Signed by: Doug Erwin 1/8/2024 8:04 PM Dictation workstation:   [id number]    CT head wo IV contrast    Result Date: 1/8/2024  Interpreted By:  Doug Erwin, STUDY: CT HEAD WO IV CONTRAST; CT CERVICAL SPINE WO IV CONTRAST;  1/8/2024 7:02 pm   INDICATION: Signs/Symptoms:fall; Signs/Symptoms:Fall   COMPARISON: 08/2000   ACCESSION NUMBER(S): [id number]; [id number]   ORDERING CLINICIAN: ISA PEREZ   TECHNIQUE: Axial noncontrast CT images of head with coronal and sagittal reconstructed images. Axial noncontrast CT images of the cervical spine with coronal and sagittal reconstructed images.   FINDINGS: CT head: Global volume loss and mild  chronic small vessel ischemic change. Unchanged slightly prominent subdural fluid again seen more so on the left. No evidence of acute hemorrhage. No mass effect or midline shift   Minimal ethmoid sinus mucosal thickening. Vascular calcification   Cervical spine: Scattered multilevel degenerative disc disease. Degree is mild. No evidence of acute cervical spine fracture. Severe vascular calcification. Features suggesting mild edema       Senescent changes. No evidence of acute cervical spine fracture.   Volume loss and chronic small vessel ischemic change seen within the brain. No evidence of acute intracranial hemorrhage.   Signed by: Doug Erwin 1/8/2024 8:04 PM Dictation workstation:   [id number]    XR chest 1 view    Result Date: 1/8/2024  Interpreted By:  Seth Durant, STUDY: XR CHEST 1 VIEW;  1/8/2024 6:38 pm   INDICATION: Signs/Symptoms:fall.   COMPARISON: 02/19/2014   ACCESSION NUMBER(S): [id number]   ORDERING CLINICIAN: ISAIAH MEZA   FINDINGS:   The cardiac silhouette is unremarkable. Costophrenic angles are sharp. Chronic interstitial opacities are noted throughout the bilateral lungs. No definite focal airspace disease is seen. There are calcified granulomas in the right upper lung. The trachea is midline. There is no pneumothorax. No acute osseous abnormality is seen.       1.  No active cardiopulmonary process.     Signed by: Seth Durant 1/8/2024 6:44 PM Dictation workstation:   [id number]    XR femur right 2+ views    Result Date: 1/8/2024  Interpreted By:  Seth Durant, STUDY: XR FEMUR RIGHT 2+ VIEWS; ;  1/8/2024 6:29 pm   INDICATION: Signs/Symptoms:Fall.   COMPARISON: None.   ACCESSION NUMBER(S): [id number]   ORDERING CLINICIAN: ISA PEREZ   FINDINGS: Please see the impression       Displaced subcapital/mid cervical fracture of the right femur.   Moderate to severe osteoarthritis of the right hip and knee joints.   Mild diffuse osteopenia.     MACRO: None   Signed by: Seth  Carleen 1/8/2024 6:43 PM Dictation workstation:   [id number]    XR hip right with pelvis when performed 2 or 3 views    Result Date: 1/8/2024  Interpreted By:  Seth Durant, STUDY: XR HIP RIGHT WITH PELVIS WHEN PERFORMED 2 OR 3 VIEWS; ;  1/8/2024 6:29 pm   INDICATION: Signs/Symptoms:Fall.   COMPARISON: None.   ACCESSION NUMBER(S): [id number]   ORDERING CLINICIAN: ISA PEREZ   FINDINGS: Please see the impression       Displaced subcapital/mid cervical fracture of the right femur.   Moderate-to-severe osteoarthritis of the bilateral hip joints.   Multilevel lumbar spondylosis     MACRO: None   Signed by: Seth Durant 1/8/2024 6:43 PM Dictation workstation:   [id number]         Assessment/Plan   Ms Sims is an 87yo woman with hypothyroidism, TIA (remote), hx PMR admitted with femur fracture in the setting of mechanical fall.     #Displaced subcapital/mid cervical fracture of the right femur s/p R hemiarthroplasty with Dr Barros 1/9  -analgesia with tylenol, oxy, dilaudid for breakthrough - reduce dose of oxy as an 87yo woman does not need 10mg of oxycodone  -discontinue flexeril d/t age and relative comfort  -bowel regimen  -PT/OT      #hypothyroidism  -cont levothyroxine     #hx TIA  -holding ASA d/t saturated dressing - resume once OK per ortho     #HTN  -cont amlodipine      FEN/GI: NPO for OR  Access: PIV  Ppx: holding per ortho - resume once OK per them   Code: full     Dispo: [facility] - pending precert         Malnutrition Diagnosis Status: New  Malnutrition Diagnosis: Moderate malnutrition related to starvation  As Evidenced by: moderate muscle wasting, moderate subcutaneous fat loss, pt consuming <75% of estimated energy needs x 6 months and unintentional wt loss of 20% x 1 year.  I agree with the dietitian's malnutrition diagnosis.         Maribell Mcqueen MD

## 2024-01-10 NOTE — PROGRESS NOTES
Occupational Therapy    Occupational Therapy    Evaluation    Patient Name: Lona Sims  MRN: [id number]  Today's Date: 1/10/2024  Time Calculation  Start Time: 0907  Stop Time: 0928  Time Calculation (min): 21 min    Assessment  IP OT Assessment  OT Assessment:  (Pt. presents with decreased balance and endurance impacting pt. ability to safely complete ADLs and mobility independently. Pt. benefits from moderate intensity therapy to return to PLOF)  Prognosis: Fair  Barriers to Discharge: Decreased caregiver support  Evaluation/Treatment Tolerance: Patient limited by pain  End of Session Communication: Bedside nurse  End of Session Patient Position: Up in chair, Alarm on    Plan:  Treatment Interventions: ADL retraining, Functional transfer training  OT Frequency: 5 times per week  OT Discharge Recommendations: Moderate intensity level of continued care  OT Recommended Transfer Status: Assist of 2  OT - OK to Discharge: Yes (Next level of care when cleared by medical team)    Subjective   Per EMR: Lona Sims is a 88 y.o. female presenting with chief complaint of hip pain after sustaining what is described as a mechanical fall.  Patient was reportedly a sending a small staircase when she lost her balance and fell approximately 2 steps, with 4 feet to the ground with the majority of impact absorbed by her right hip.  Hip pain upon arrival was reported as 5 out of 10 in severity.  There was noted range of motion restrictions involving the right hip as well as tenderness to palpation.  Imaging obtained showed evidence of right femur fracture.  Case was discussed with orthopedic service.  Patient admitted for further evaluation.      s/p Right hip fracture hemiarthroplasty using a Mellisa size 12 LD fracture cemented femoral stem with 40 mm monopolar head and a 7 mm neck adapter     Current Problem:  1. Oth fracture of unsp femur, init encntr for closed fracture (CMS/[facility])        2. Closed fracture of neck of right  femur, initial encounter (CMS/[facility])            General:  General  Reason for Referral: ADLs, discharge planning  Referred By: Dr. Mcqueen  Past Medical History Relevant to Rehab: CVA with right side weakness  Family/Caregiver Present: No  Co-Treatment: PT  Co-Treatment Reason: Safety  Prior to Session Communication: Bedside nurse  Patient Position Received: Bed, 3 rail up, Alarm on    Precautions:  LE Weight Bearing Status: Weight Bearing as Tolerated (Right LE)  Medical Precautions: Fall precautions  Post-Surgical Precautions: Right hip precautions        Pain:  Pain Assessment  Pain Assessment: 0-10  Pain Score: 3  Pain Type: Surgical pain  Pain Location: Hip  Pain Orientation: Right    Objective     Cognition:  Overall Cognitive Status: Within Functional Limits  Orientation Level: Oriented X4             Home Living:  Home Living Comments:  (Pt. lives alone in a mobile home with 4 entry steps. Has walk in shower with grab bars and shower chair. Pt. uses quad cane or transport chair for moiblity. PLOF MOD I for ADLs, mostly uses transport chair.)     Prior Function:  Level of [place]: Independent with ADLs and functional transfers  Receives Help From: Family  ADL Assistance: Independent  Homemaking Assistance: Independent  Ambulatory Assistance: Independent      ADL:  Eating Deficit: Setup  Grooming Assistance: Moderate  LE Dressing Assistance: Maximal    Activity Tolerance:  Endurance: Tolerates 10 - 20 min exercise with multiple rests    Bed Mobility/Transfers:   Bed Mobility  Bed Mobility:  (supine to sit max x 2)  Transfers  Transfer:  (sit<>stand max assist x 2 hand held asist; pivot transfer with gait belt hand held assist)        Sitting Balance:  Static Sitting Balance  Static Sitting-Balance Support: Bilateral upper extremity supported  Static Sitting-Level of Assistance: Minimum assistance    Standing Balance:  Static Standing Balance  Static Standing-Balance Support: Bilateral upper extremity  supported  Static Standing-Level of Assistance: Maximum assistance        Hand Function:  Hand Function  Gross Grasp: Impaired (right hand contracted)  Coordination: Impaired (right side impaired due to CVA)    Extremities: RUE   RUE : Exceptions to WFL (Right hand fisted, elbow contracted, shoulder flexion ~70; Pt. states able to move arm and open hand when relaxed) and CHICOE   LUE: Within Functional Limits    Outcome Measures: [facility] Daily Activity  Putting on and taking off regular lower body clothing: Total  Bathing (including washing, rinsing, drying): A lot  Putting on and taking off regular upper body clothing: A lot  Toileting, which includes using toilet, bedpan or urinal: A lot  Taking care of personal grooming such as brushing teeth: A little  Eating Meals: A little  Daily Activity - Total Score: 13          EDUCATION:  Education  Individual(s) Educated: Patient  Education Provided: Fall precautons, Risk and benefits of OT discussed with patient or other, POC discussed and agreed upon (hip precautions)  Patient Response to Education: Patient/Caregiver Verbalized Understanding of Information      Goals:   Encounter Problems       Encounter Problems (Active)       Dressings Lower Extremities       STG - Patient to complete lower body dressing min assist       Start:  01/10/24    Expected End:  01/24/24               Grooming       STG - Patient completes grooming SUP       Start:  01/10/24    Expected End:  01/24/24            STG - Patient will tolerate standing 2-4 min       Start:  01/10/24    Expected End:  01/24/24               Transfers       STG - Patient will perform toilet transfer min assist       Start:  01/10/24    Expected End:  01/24/24            STG - Patient will follow hip precautions during transfers       Start:  01/10/24    Expected End:  01/24/24

## 2024-01-10 NOTE — NURSING NOTE
Patient c/o pain to bilat heels and right hip.  Patient assisted with repositioining and was medicated with oxy 10mg po at 0630.

## 2024-01-10 NOTE — PROGRESS NOTES
"Lona Sims is a 88 y.o. female on day 2 of admission presenting with Oth fracture of unsp femur, init encntr for closed fracture (CMS/[facility]).    Subjective   Doing well  Says pain is well controlled  Pain level 0-3/10 after 8 am  No BM yet   No flatus  Nasal cannula in place  Denies any fever, chills, chest pain, SOB, cough, abdominal pain, nausea, or vomiting or numbness          Objective     Physical Exam  Dressing is saturated but intact righ lower extremity distally with intact dorsiflexion/plantar flexion/EHL and intact light touch sensation  No respiratory distress, on nasal cannula   No apparent abdominal distension   Generalized weakness  No clear focal neurological deficit   Neck supple   Skin warm and dry   Patient awake and alert, no acute distress  Appropriate mood, cooperative    Last Recorded Vitals  Blood pressure 101/53, pulse 80, temperature 37.1 °C (98.8 °F), temperature source Temporal, resp. rate 18, height 1.473 m (4' 10\"), weight (!) 36 kg (79 lb 5.9 oz), SpO2 99 %.  Intake/Output last 3 Shifts:  I/O last 3 completed shifts:  In: 971.4 (27 mL/kg) [P.O.:265; I.V.:606.4 (16.8 mL/kg); IV Piggyback:100]  Out: 75 (2.1 mL/kg) [Blood:75]  Weight: 36 kg     Relevant Results      Scheduled medications  acetaminophen, 650 mg, oral, q6h VICTORIA  docusate sodium, 100 mg, oral, BID  enoxaparin, 40 mg, subcutaneous, Daily      Continuous medications  lactated Ringer's, 50 mL/hr, Last Rate: 50 mL/hr (01/10/24 0611)  oxygen, 2 L/min      PRN medications  PRN medications: benzocaine-menthol, bisacodyl, cyclobenzaprine, diphenhydrAMINE, HYDROmorphone, morphine, naloxone, naloxone, naloxone, ondansetron ODT **OR** ondansetron, oxyCODONE, prochlorperazine **OR** prochlorperazine **OR** prochlorperazine  Results for orders placed or performed during the hospital encounter of 01/08/24 (from the past 24 hour(s))   CBC   Result Value Ref Range    WBC 10.8 4.4 - 11.3 x10*3/uL    nRBC 0.0 0.0 - 0.0 /100 WBCs    RBC 3.42 " (L) 4.00 - 5.20 x10*6/uL    Hemoglobin 10.3 (L) 12.0 - 16.0 g/dL    Hematocrit 33.2 (L) 36.0 - 46.0 %    MCV 97 80 - 100 fL    MCH 30.1 26.0 - 34.0 pg    MCHC 31.0 (L) 32.0 - 36.0 g/dL    RDW 13.2 11.5 - 14.5 %    Platelets 128 (L) 150 - 450 x10*3/uL   Basic metabolic panel   Result Value Ref Range    Glucose 106 (H) 74 - 99 mg/dL    Sodium 133 (L) 136 - 145 mmol/L    Potassium 4.7 3.5 - 5.3 mmol/L    Chloride 101 98 - 107 mmol/L    Bicarbonate 24 21 - 32 mmol/L    Anion Gap 13 10 - 20 mmol/L    Urea Nitrogen 19 6 - 23 mg/dL    Creatinine 0.69 0.50 - 1.05 mg/dL    eGFR 84 >60 mL/min/1.73m*2    Calcium 7.8 (L) 8.6 - 10.3 mg/dL                           Assessment/Plan   Principal Problem:    Oth fracture of unsp femur, init encntr for closed fracture (CMS/HCC)  Active Problems:    Closed fracture of neck of right femur with malunion    Weakness    Ambulatory dysfunction    Closed fracture of neck of right femur (CMS/HCC)  Acute Post Operative Blood Loss Anemia    POD#1  Right hip fracture hemiarthroplasty     PT/OT   Weight bearing as tolerated RLE  Pain Regimen: tylenol, flexeril, IV dilaudid, IV morphine, oxycodone   Hemoglobin: 10.3    DVT prophylaxis: Lovenox (PER DR BARROS DUE TO SATURATION OF PATIENT'S DRESSING, HOLD ANTI-COAGULATION X24 HOURS), SCDS  Bowel: bisacodyl , colace   Nausea/vomiting: compazine, zofran  Ice   ICS  Disposition:SNF versus AR    Seen and evaluated with Dr Hopper     Case Dicussed with Dr Barros: recommends holding anti-coagulation x24 hours, ordering antibiotics (Keflex 500mg TID) until dressing is dry; will order dressing change too     Follow up with Dr Zachariah Barros  in 2 weeks         I spent 40 minutes in the professional and overall care of this patient.      Chavez Carson PA-C

## 2024-01-10 NOTE — PROGRESS NOTES
"Nutrition Initial Assessment:   Nutrition Assessment    Reason for Assessment: Admission nursing screening (MST score 2 and low BMI.)    Patient is a 88 y.o. female presenting from home alone for fracture of rt femur. Pt POD 1. Pt w/fall at home PTA. Pt daughter present during RD visit.     Past Medical History: HTN, TIA, hypothyroidism   has a past medical history of Personal history of other diseases of the musculoskeletal system and connective tissue (07/24/2014).  Surgical History   has a past surgical history that includes Hip surgery (08/15/2014); Thyroid surgery (08/15/2014); Tubal ligation (08/15/2014); and CT angio head w and wo IV contrast (3/1/2019).        Nutrition History:  Energy Intake: Fair 50-75 %  Food and Nutrient History: Pt reports decreased PO intakes at home since her fiance moved into a nursing home, as he was the primary cook. Pt now visits the fiance and will sometimes have her meals at the facility. Pt has always eaten small meals. Pt dines out w/daughters 1-2x/week and will save half of the meal for another meal. Pt likes cereal w/fruit and juice in the morning. Pt often has soup and/or sandwich for lunch. Pt is able to cook and loves to have salads along w/dinner meal. Pt likes peanut butter and adds nuts to her salads.  Vitamin/Herbal Supplement Use: Pt drinks 1.5 bottles of Ensure or Boost/day. Pt likes chocolate and strawberry flavors, never tried vanilla.  Food Allergies/Intolerances:  None - cheese is constipating   GI Symptoms: None  Oral Problems: None       Anthropometrics:  Height: 147.3 cm (4' 10\")   Weight: (!) 36 kg (79 lb 5.9 oz)   BMI (Calculated): 16.59             Weight History:     Weight Change %:  Weight History / % Weight Change: per pt 20# wt loss over unclear period of time, possibly the past year. This would be 20% wt loss x 1 year. Per archives 9/7/23 84.5#, wt loss of 5.9% x 4 months. Per pt max of 105# ever.  Significant Weight Loss: Yes  Interpretation of " "Weight Loss: 20% in 1 year    Nutrition Focused Physical Exam Findings:    Subcutaneous Fat Loss:   Orbital Fat Pads: Mild-Moderate (slight dark circles and slight hollowing)  Buccal Fat Pads: Mild-Moderate (flat cheeks, minimal bounce)  Triceps: Mild-moderate (less than ample fat tissue)  Muscle Wasting:  Temporalis: Mild-Moderate (slight depression)  Pectoralis (Clavicular Region): Mild-Moderate (some protrusion of clavicle)  Quadriceps: Mild-moderate (mild depression on inner and outer thigh)  Gastrocnemius: Mild-Moderate (not well developed muscle)  Edema:  Edema: +1 trace  Edema Location: rt hip  Physical Findings:  Skin:  (incision rt hip - surgery  1/9)    Nutrition Significant Labs:  CBC Trend:   Results from last 7 days   Lab Units 01/10/24  0536 01/09/24  0802 01/08/24  1739   WBC AUTO x10*3/uL 10.8 9.3 6.0   RBC AUTO x10*6/uL 3.42* 4.59 4.88   HEMOGLOBIN g/dL 10.3* 13.5 14.6   HEMATOCRIT % 33.2* 42.3 44.4   MCV fL 97 92 91   PLATELETS AUTO x10*3/uL 128* 177 199    , BMP Trend:   Results from last 7 days   Lab Units 01/10/24  0536 01/09/24  0802 01/08/24  1739   GLUCOSE mg/dL 106* 115* 119*   CALCIUM mg/dL 7.8* 8.8 9.3   SODIUM mmol/L 133* 136 136   POTASSIUM mmol/L 4.7 4.0 4.6   CO2 mmol/L 24 30 27   CHLORIDE mmol/L 101 101 101   BUN mg/dL 19 12 12   CREATININE mg/dL 0.69 0.63 0.66    , A1C:No results found for: \"HGBA1C\", BG POCT trend:    , Liver Function Trend:   Results from last 7 days   Lab Units 01/08/24  1739   ALK PHOS U/L 98   AST U/L 68*   ALT U/L 40   BILIRUBIN TOTAL mg/dL 0.6    , Renal Lab Trend:   Results from last 7 days   Lab Units 01/10/24  0536 01/09/24  0802 01/08/24  1739   POTASSIUM mmol/L 4.7 4.0 4.6   PHOSPHORUS mg/dL  --  3.9  --    SODIUM mmol/L 133* 136 136   MAGNESIUM mg/dL  --  2.09  --    EGFR mL/min/1.73m*2 84 85 84   BUN mg/dL 19 12 12   CREATININE mg/dL 0.69 0.63 0.66    , Lipid Panel: No results found for: \"CHOL\", \"HDL\", \"CHHDL\", \"LDLF\", \"VLDL\", \"TRIG\" , Vit D: No results " "found for: \"VITD25\" , Vit B12: No results found for: \"ANRARXWS44\" , Iron Panel: No results found for: \"IRON\", \"TIBC\", \"FERRITIN\" , Folate: No results found for: \"FOLATE\"     Nutrition Specific Medications:  No current facility-administered medications on file prior to encounter.     Current Outpatient Medications on File Prior to Encounter   Medication Sig Dispense Refill    amLODIPine (Norvasc) 5 mg tablet Take 1 tablet (5 mg) by mouth once daily.      aspirin 81 mg EC tablet Take 1 tablet (81 mg) by mouth once daily.      atorvastatin (Lipitor) 40 mg tablet Take 1 tablet (40 mg) by mouth once daily.      calcium carbonate (CALCIUM 500 ORAL) Take 1 capsule by mouth once daily.      cholecalciferol (Vitamin D-3) 50 MCG (2000 UT) tablet Take 1 tablet (50 mcg) by mouth once daily.      levothyroxine (Synthroid, Levoxyl) 75 mcg tablet Take 1 tablet (75 mcg) by mouth once daily in the morning. Take before meals.           I/O:    ;          Dietary Orders (From admission, onward)       Start     Ordered    01/10/24 1341  Oral nutritional supplements  Until discontinued        Comments: + PRN per pt request   Question Answer Comment   Deliver with Breakfast    Select supplement: Ensure Plus High Protein        01/10/24 1342    01/10/24 1340  Oral nutritional supplements  Until discontinued        Question Answer Comment   Deliver with Lunch    Select supplement: Magic Cup        01/10/24 1342    01/09/24 1706  Adult diet Regular  Diet effective now        Question:  Diet type  Answer:  Regular    01/09/24 1705                     Estimated Needs:   Total Energy Estimated Needs (kCal):  (4711-3073 (30-35kcal/kg))     Total Protein Estimated Needs (g):  (46-69 (1.0-1.5g/kg IBW))     Total Fluid Estimated Needs (mL):  (1mL/kcal)           Nutrition Diagnosis   Malnutrition Diagnosis  Patient has Malnutrition Diagnosis: Yes  Diagnosis Status: New  Malnutrition Diagnosis: Moderate malnutrition related to starvation  As " "Evidenced by: moderate muscle wasting, moderate subcutaneous fat loss, pt consuming <75% of estimated energy needs x 6 months and unintentional wt loss of 20% x 1 year.            Nutrition Interventions/Recommendations         Nutrition Prescription:  Individualized Nutrition Prescription Provided for : Diet: continue w/regular diet as ordered to promote PO intakes. Diet clerk aware of pt request for meats and produce to be cut up and softer.        Nutrition Interventions:   Food and/or Nutrient Delivery Interventions  Interventions: Meals and snacks, Medical food supplement  Meals and Snacks: General healthful diet  Goal: will consume 75% of meals.  Medical Food Supplement: Commercial beverage  Additional Interventions: will provide Magic Cups (290kcal, 9g pro per 4oz serving) at lunch meal. Will provide Ensure Plus High Protein (350kcal, 20g pro per 8oz serving) at breakfast + PRN.    Coordination of Nutrition Care by a Nutrition Professional  Collaboration and Referral of Nutrition Care: Collaboration by nutrition professional with other providers  Goal: JUAN Echols    Nutrition Education:   Education Documentation  Nutrition Care Manual, taught by Christi Tomas RD at 1/10/2024  1:05 PM.  Learner: Family, Patient  Readiness: Acceptance  Method: Explanation, Handout  Response: Verbalizes Understanding  Comment: Provided AND handouts from [facility]: \"High Calorie, High Protein Recipes\" and \"Tips for Increasing Calories\". Reviewed w/pt and daughter. Provided contact info. Discussed AYR ordering and oral nutrition supplements. Encouraged PO intakes and ONS b/w meals.               Nutrition Monitoring and Evaluation   Food/Nutrient Related History Monitoring  Monitoring and Evaluation Plan: Energy intake, Amount of food  Energy Intake: Estimated energy intake  Criteria: will consume 75% of estimated energy needs.  Amount of Food: Medical food intake, Estimated amout of food  Criteria: will consume provided oral " nutrition supplements.    Body Composition/Growth/Weight History  Monitoring and Evaluation Plan: Weight change  Weight Change: Weight gain  Criteria: promote healthy wt gain.         Nutrition Focused Physical Findings  Monitoring and Evaluation Plan: Skin  Skin: Other (Comment)  Criteria: incision healing.       Time Spent/Follow-up Reminder:   Time Spent (min): 60 minutes  Follow up: Provided inpatient RDN contact information  Last Date of Nutrition Visit: 01/10/24  Nutrition Follow-Up Needed?: 3-5 days  Follow up Comment: DONALD

## 2024-01-11 ENCOUNTER — APPOINTMENT (OUTPATIENT)
Dept: RADIOLOGY | Facility: HOSPITAL | Age: 89
DRG: 522 | End: 2024-01-11
Payer: MEDICARE

## 2024-01-11 LAB
ABO GROUP (TYPE) IN BLOOD: NORMAL
ALBUMIN SERPL BCP-MCNC: 2.6 G/DL (ref 3.4–5)
ANION GAP SERPL CALC-SCNC: 9 MMOL/L (ref 10–20)
APPEARANCE UR: ABNORMAL
BACTERIA #/AREA URNS AUTO: ABNORMAL /HPF
BILIRUB UR STRIP.AUTO-MCNC: NEGATIVE MG/DL
BLOOD EXPIRATION DATE: NORMAL
BUN SERPL-MCNC: 14 MG/DL (ref 6–23)
CALCIUM SERPL-MCNC: 8 MG/DL (ref 8.6–10.3)
CHLORIDE SERPL-SCNC: 99 MMOL/L (ref 98–107)
CO2 SERPL-SCNC: 29 MMOL/L (ref 21–32)
COLOR UR: YELLOW
CREAT SERPL-MCNC: 0.56 MG/DL (ref 0.5–1.05)
DISPENSE STATUS: NORMAL
EGFRCR SERPLBLD CKD-EPI 2021: 88 ML/MIN/1.73M*2
ERYTHROCYTE [DISTWIDTH] IN BLOOD BY AUTOMATED COUNT: 13.2 % (ref 11.5–14.5)
FLUAV RNA RESP QL NAA+PROBE: NOT DETECTED
FLUBV RNA RESP QL NAA+PROBE: NOT DETECTED
GLUCOSE SERPL-MCNC: 99 MG/DL (ref 74–99)
GLUCOSE UR STRIP.AUTO-MCNC: NEGATIVE MG/DL
HCT VFR BLD AUTO: 20.9 % (ref 36–46)
HGB BLD-MCNC: 6.6 G/DL (ref 12–16)
KETONES UR STRIP.AUTO-MCNC: NEGATIVE MG/DL
LEUKOCYTE ESTERASE UR QL STRIP.AUTO: ABNORMAL
MCH RBC QN AUTO: 29.7 PG (ref 26–34)
MCHC RBC AUTO-ENTMCNC: 31.6 G/DL (ref 32–36)
MCV RBC AUTO: 94 FL (ref 80–100)
NITRITE UR QL STRIP.AUTO: NEGATIVE
NRBC BLD-RTO: 0 /100 WBCS (ref 0–0)
PH UR STRIP.AUTO: 6 [PH]
PHOSPHATE SERPL-MCNC: 2.1 MG/DL (ref 2.5–4.9)
PLATELET # BLD AUTO: 111 X10*3/UL (ref 150–450)
POTASSIUM SERPL-SCNC: 4.5 MMOL/L (ref 3.5–5.3)
PRODUCT BLOOD TYPE: 5100
PRODUCT CODE: NORMAL
PROT UR STRIP.AUTO-MCNC: NEGATIVE MG/DL
RBC # BLD AUTO: 2.22 X10*6/UL (ref 4–5.2)
RBC # UR STRIP.AUTO: NEGATIVE /UL
RBC #/AREA URNS AUTO: ABNORMAL /HPF
RH FACTOR (ANTIGEN D): NORMAL
SARS-COV-2 RNA RESP QL NAA+PROBE: NOT DETECTED
SODIUM SERPL-SCNC: 132 MMOL/L (ref 136–145)
SP GR UR STRIP.AUTO: 1.01
SQUAMOUS #/AREA URNS AUTO: ABNORMAL /HPF
UNIT ABO: NORMAL
UNIT NUMBER: NORMAL
UNIT RH: NORMAL
UNIT VOLUME: 350
UROBILINOGEN UR STRIP.AUTO-MCNC: <2 MG/DL
WBC # BLD AUTO: 15.1 X10*3/UL (ref 4.4–11.3)
WBC #/AREA URNS AUTO: ABNORMAL /HPF
XM INTEP: NORMAL

## 2024-01-11 PROCEDURE — 2500000001 HC RX 250 WO HCPCS SELF ADMINISTERED DRUGS (ALT 637 FOR MEDICARE OP): Performed by: ORTHOPAEDIC SURGERY

## 2024-01-11 PROCEDURE — 85027 COMPLETE CBC AUTOMATED: CPT | Performed by: STUDENT IN AN ORGANIZED HEALTH CARE EDUCATION/TRAINING PROGRAM

## 2024-01-11 PROCEDURE — 99232 SBSQ HOSP IP/OBS MODERATE 35: CPT | Performed by: STUDENT IN AN ORGANIZED HEALTH CARE EDUCATION/TRAINING PROGRAM

## 2024-01-11 PROCEDURE — 2500000001 HC RX 250 WO HCPCS SELF ADMINISTERED DRUGS (ALT 637 FOR MEDICARE OP): Performed by: STUDENT IN AN ORGANIZED HEALTH CARE EDUCATION/TRAINING PROGRAM

## 2024-01-11 PROCEDURE — 71045 X-RAY EXAM CHEST 1 VIEW: CPT

## 2024-01-11 PROCEDURE — 2500000004 HC RX 250 GENERAL PHARMACY W/ HCPCS (ALT 636 FOR OP/ED): Performed by: STUDENT IN AN ORGANIZED HEALTH CARE EDUCATION/TRAINING PROGRAM

## 2024-01-11 PROCEDURE — 97535 SELF CARE MNGMENT TRAINING: CPT | Mod: GO,CO

## 2024-01-11 PROCEDURE — P9016 RBC LEUKOCYTES REDUCED: HCPCS

## 2024-01-11 PROCEDURE — 84100 ASSAY OF PHOSPHORUS: CPT | Performed by: STUDENT IN AN ORGANIZED HEALTH CARE EDUCATION/TRAINING PROGRAM

## 2024-01-11 PROCEDURE — 81001 URINALYSIS AUTO W/SCOPE: CPT | Performed by: STUDENT IN AN ORGANIZED HEALTH CARE EDUCATION/TRAINING PROGRAM

## 2024-01-11 PROCEDURE — 1100000001 HC PRIVATE ROOM DAILY

## 2024-01-11 PROCEDURE — 87086 URINE CULTURE/COLONY COUNT: CPT | Mod: STJLAB | Performed by: STUDENT IN AN ORGANIZED HEALTH CARE EDUCATION/TRAINING PROGRAM

## 2024-01-11 PROCEDURE — 97110 THERAPEUTIC EXERCISES: CPT | Mod: GP,CQ

## 2024-01-11 PROCEDURE — 71045 X-RAY EXAM CHEST 1 VIEW: CPT | Performed by: RADIOLOGY

## 2024-01-11 PROCEDURE — 36415 COLL VENOUS BLD VENIPUNCTURE: CPT | Performed by: STUDENT IN AN ORGANIZED HEALTH CARE EDUCATION/TRAINING PROGRAM

## 2024-01-11 PROCEDURE — 99233 SBSQ HOSP IP/OBS HIGH 50: CPT | Performed by: PHYSICIAN ASSISTANT

## 2024-01-11 PROCEDURE — 87040 BLOOD CULTURE FOR BACTERIA: CPT | Mod: STJLAB | Performed by: STUDENT IN AN ORGANIZED HEALTH CARE EDUCATION/TRAINING PROGRAM

## 2024-01-11 PROCEDURE — 94760 N-INVAS EAR/PLS OXIMETRY 1: CPT

## 2024-01-11 PROCEDURE — 87636 SARSCOV2 & INF A&B AMP PRB: CPT | Performed by: STUDENT IN AN ORGANIZED HEALTH CARE EDUCATION/TRAINING PROGRAM

## 2024-01-11 PROCEDURE — 36430 TRANSFUSION BLD/BLD COMPNT: CPT

## 2024-01-11 PROCEDURE — 2500000001 HC RX 250 WO HCPCS SELF ADMINISTERED DRUGS (ALT 637 FOR MEDICARE OP): Performed by: PHYSICIAN ASSISTANT

## 2024-01-11 RX ORDER — CEFTRIAXONE 1 G/50ML
1 INJECTION, SOLUTION INTRAVENOUS EVERY 24 HOURS
Status: DISCONTINUED | OUTPATIENT
Start: 2024-01-11 | End: 2024-01-13 | Stop reason: HOSPADM

## 2024-01-11 RX ADMIN — ACETAMINOPHEN 650 MG: 325 TABLET ORAL at 12:27

## 2024-01-11 RX ADMIN — SODIUM CHLORIDE, POTASSIUM CHLORIDE, SODIUM LACTATE AND CALCIUM CHLORIDE 1000 ML: 600; 310; 30; 20 INJECTION, SOLUTION INTRAVENOUS at 07:59

## 2024-01-11 RX ADMIN — ATORVASTATIN CALCIUM 40 MG: 40 TABLET, FILM COATED ORAL at 20:22

## 2024-01-11 RX ADMIN — OXYCODONE HYDROCHLORIDE 5 MG: 5 TABLET ORAL at 12:27

## 2024-01-11 RX ADMIN — CEPHALEXIN 500 MG: 500 CAPSULE ORAL at 16:18

## 2024-01-11 RX ADMIN — CEPHALEXIN 500 MG: 500 CAPSULE ORAL at 06:05

## 2024-01-11 RX ADMIN — CEFTRIAXONE SODIUM 1 G: 1 INJECTION, SOLUTION INTRAVENOUS at 20:22

## 2024-01-11 RX ADMIN — ACETAMINOPHEN 650 MG: 325 TABLET ORAL at 06:05

## 2024-01-11 RX ADMIN — DOCUSATE SODIUM 100 MG: 100 CAPSULE, LIQUID FILLED ORAL at 20:22

## 2024-01-11 RX ADMIN — ACETAMINOPHEN 650 MG: 325 TABLET ORAL at 18:04

## 2024-01-11 RX ADMIN — LEVOTHYROXINE SODIUM 75 MCG: 75 TABLET ORAL at 06:05

## 2024-01-11 RX ADMIN — DOCUSATE SODIUM 100 MG: 100 CAPSULE, LIQUID FILLED ORAL at 09:28

## 2024-01-11 ASSESSMENT — COGNITIVE AND FUNCTIONAL STATUS - GENERAL
EATING MEALS: A LITTLE
DAILY ACTIVITIY SCORE: 13
MOVING FROM LYING ON BACK TO SITTING ON SIDE OF FLAT BED WITH BEDRAILS: A LOT
TURNING FROM BACK TO SIDE WHILE IN FLAT BAD: A LOT
WALKING IN HOSPITAL ROOM: A LOT
MOVING TO AND FROM BED TO CHAIR: A LOT
EATING MEALS: A LITTLE
PERSONAL GROOMING: A LITTLE
MOBILITY SCORE: 7
CLIMB 3 TO 5 STEPS WITH RAILING: TOTAL
STANDING UP FROM CHAIR USING ARMS: A LOT
HELP NEEDED FOR BATHING: A LOT
MOVING TO AND FROM BED TO CHAIR: TOTAL
HELP NEEDED FOR BATHING: A LOT
MOVING FROM LYING ON BACK TO SITTING ON SIDE OF FLAT BED WITH BEDRAILS: A LOT
DRESSING REGULAR UPPER BODY CLOTHING: A LOT
STANDING UP FROM CHAIR USING ARMS: TOTAL
WALKING IN HOSPITAL ROOM: TOTAL
PERSONAL GROOMING: A LITTLE
MOBILITY SCORE: 11
TOILETING: A LOT
CLIMB 3 TO 5 STEPS WITH RAILING: TOTAL
TURNING FROM BACK TO SIDE WHILE IN FLAT BAD: TOTAL
DRESSING REGULAR LOWER BODY CLOTHING: TOTAL
DRESSING REGULAR LOWER BODY CLOTHING: A LOT
TOILETING: A LOT
DAILY ACTIVITIY SCORE: 14
DRESSING REGULAR UPPER BODY CLOTHING: A LOT

## 2024-01-11 ASSESSMENT — PAIN SCALES - GENERAL
PAINLEVEL_OUTOF10: 0 - NO PAIN
PAINLEVEL_OUTOF10: 5 - MODERATE PAIN
PAINLEVEL_OUTOF10: 0 - NO PAIN
PAINLEVEL_OUTOF10: 5 - MODERATE PAIN
PAINLEVEL_OUTOF10: 5 - MODERATE PAIN

## 2024-01-11 ASSESSMENT — PAIN DESCRIPTION - LOCATION: LOCATION: LEG

## 2024-01-11 ASSESSMENT — PAIN - FUNCTIONAL ASSESSMENT
PAIN_FUNCTIONAL_ASSESSMENT: 0-10

## 2024-01-11 ASSESSMENT — ACTIVITIES OF DAILY LIVING (ADL)
BATHING_WHERE_ASSESSED: EDGE OF BED
HOME_MANAGEMENT_TIME_ENTRY: 40
BATHING_LEVEL_OF_ASSISTANCE: MODERATE ASSISTANCE

## 2024-01-11 ASSESSMENT — PAIN DESCRIPTION - DESCRIPTORS
DESCRIPTORS: ACHING
DESCRIPTORS: ACHING

## 2024-01-11 ASSESSMENT — PAIN DESCRIPTION - ORIENTATION: ORIENTATION: RIGHT

## 2024-01-11 NOTE — PROGRESS NOTES
Notified by the direct precert team that insurance denied acute rehab. Sent the P2P information to MD. Updated the acute rehab. Updated patient at bedside. Patient would like to await P2P and to try for expedited appeal if denial is upheld after P2P.     1117- Denial upheld after P2P. [facility] Acute Rehab liaison Sarah to start the expedited appeal today and will send updates to the insurance company tomorrow as well.     1138- Updated patient and family at bedside that the acute rehab is starting the expedited appeal today.     Leeann Nelson RN

## 2024-01-11 NOTE — NURSING NOTE
1/11/2024    0754- Abnormal labs and vitals. Attending and consulting notified.    0759- 1L LR bolus infusing at this time.     0821- Daughter updated by this nurse per patient's request.     1028- 1 unit PRBC transfusing.    1200- Pt c/o tingling in right leg while sitting in chair. Repositioned patient into bed. Notified ortho PA.     1410- Surgical dressing to right hip changed by ortho PA.    1800- Patient continues to have elevated temp, tylenol administered as ordered.    Kinza Luna LPN

## 2024-01-11 NOTE — PROGRESS NOTES
Lona Sims is a 88 y.o. female on day 3 of admission presenting with Oth fracture of unsp femur, init encntr for closed fracture (CMS/[facility]).      Subjective   Patient febrile, 38.5 at 4pm yesterday, then low grade fever through the night. Afebrile this AM. She denies new symptoms including cough/dyspnea, N/V, dysuria.   Hgb 6.6 this AM      Objective     Last Recorded Vitals  /60   Pulse 80   Temp 37.6 °C (99.7 °F) (Tympanic)   Resp 16   Wt (!) 36 kg (79 lb 5.9 oz)   SpO2 100%   Intake/Output last 3 Shifts:    Intake/Output Summary (Last 24 hours) at 1/11/2024 1217  Last data filed at 1/11/2024 1058  Gross per 24 hour   Intake 2000 ml   Output 800 ml   Net 1200 ml       Admission Weight  Weight: (!) 36 kg (79 lb 5.9 oz) (01/08/24 1707)    Daily Weight  01/08/24 : (!) 36 kg (79 lb 5.9 oz)      Physical Exam  Gen: frail, no acute distress  HEENT: MMM, EOMI, no scleral icterus  Neck: supple, no LAD  CV: RRR, II/VI systolic ejection murmur  Lungs: good air entry b/l, no rhonchi/wheezes appreciated  Abd: soft, nontender, nondistended, normoactive BS  Ext: surgical site dressed, no surrounding erythema, distal pulses intact  Skin: no rashes/lesions  Neuro: Cns III-XII grossly intact, alert and oriented x3    Relevant Results          Scheduled medications  acetaminophen, 650 mg, oral, q6h VICTORIA  amLODIPine, 5 mg, oral, Daily  [Held by provider] aspirin, 81 mg, oral, Daily  atorvastatin, 40 mg, oral, Nightly  cephalexin, 500 mg, oral, q8h VICTORIA  docusate sodium, 100 mg, oral, BID  [Held by provider] enoxaparin, 40 mg, subcutaneous, Daily  levothyroxine, 75 mcg, oral, Daily before breakfast      Continuous medications     PRN medications  PRN medications: benzocaine-menthol, bisacodyl, diphenhydrAMINE, HYDROmorphone, naloxone, naloxone, naloxone, ondansetron ODT **OR** ondansetron, oxyCODONE, prochlorperazine **OR** prochlorperazine **OR** prochlorperazine    Results for orders placed or performed during the  hospital encounter of 01/08/24 (from the past 24 hour(s))   CBC   Result Value Ref Range    WBC 13.1 (H) 4.4 - 11.3 x10*3/uL    nRBC 0.0 0.0 - 0.0 /100 WBCs    RBC 2.68 (L) 4.00 - 5.20 x10*6/uL    Hemoglobin 8.0 (L) 12.0 - 16.0 g/dL    Hematocrit 25.3 (L) 36.0 - 46.0 %    MCV 94 80 - 100 fL    MCH 29.9 26.0 - 34.0 pg    MCHC 31.6 (L) 32.0 - 36.0 g/dL    RDW 13.2 11.5 - 14.5 %    Platelets 129 (L) 150 - 450 x10*3/uL   Basic metabolic panel   Result Value Ref Range    Glucose 149 (H) 74 - 99 mg/dL    Sodium 133 (L) 136 - 145 mmol/L    Potassium 4.3 3.5 - 5.3 mmol/L    Chloride 97 (L) 98 - 107 mmol/L    Bicarbonate 30 21 - 32 mmol/L    Anion Gap 10 10 - 20 mmol/L    Urea Nitrogen 17 6 - 23 mg/dL    Creatinine 0.74 0.50 - 1.05 mg/dL    eGFR 78 >60 mL/min/1.73m*2    Calcium 8.1 (L) 8.6 - 10.3 mg/dL   CBC   Result Value Ref Range    WBC 15.1 (H) 4.4 - 11.3 x10*3/uL    nRBC 0.0 0.0 - 0.0 /100 WBCs    RBC 2.22 (L) 4.00 - 5.20 x10*6/uL    Hemoglobin 6.6 (L) 12.0 - 16.0 g/dL    Hematocrit 20.9 (L) 36.0 - 46.0 %    MCV 94 80 - 100 fL    MCH 29.7 26.0 - 34.0 pg    MCHC 31.6 (L) 32.0 - 36.0 g/dL    RDW 13.2 11.5 - 14.5 %    Platelets 111 (L) 150 - 450 x10*3/uL   Renal function panel   Result Value Ref Range    Glucose 99 74 - 99 mg/dL    Sodium 132 (L) 136 - 145 mmol/L    Potassium 4.5 3.5 - 5.3 mmol/L    Chloride 99 98 - 107 mmol/L    Bicarbonate 29 21 - 32 mmol/L    Anion Gap 9 (L) 10 - 20 mmol/L    Urea Nitrogen 14 6 - 23 mg/dL    Creatinine 0.56 0.50 - 1.05 mg/dL    eGFR 88 >60 mL/min/1.73m*2    Calcium 8.0 (L) 8.6 - 10.3 mg/dL    Phosphorus 2.1 (L) 2.5 - 4.9 mg/dL    Albumin 2.6 (L) 3.4 - 5.0 g/dL   VERIFY ABO/Rh Group Test   Result Value Ref Range    ABO TYPE B     Rh TYPE POS    Prepare RBC: 1 Units   Result Value Ref Range    PRODUCT CODE V0429B42     Unit Number [id number]     Unit ABO O     Unit RH POS     XM INTEP COMP     Dispense Status IS     Blood Expiration Date January 23, 2024 23:59 EST     PRODUCT BLOOD  TYPE 5100     UNIT VOLUME 350    Sars-CoV-2 and Influenza A/B PCR   Result Value Ref Range    Flu A Result Not Detected Not Detected    Flu B Result Not Detected Not Detected    Coronavirus 2019, PCR Not Detected Not Detected       XR chest 1 view    Result Date: 1/11/2024  Interpreted By:  Elroy Meza, STUDY: XR CHEST 1 VIEW;  1/11/2024 8:22 am   INDICATION: Signs/Symptoms:fever, leukocytosis, s/p arthroplasty.   COMPARISON: 08/04/2024   ACCESSION NUMBER(S): [id number]   ORDERING CLINICIAN: PORTER ELIZABETH   FINDINGS: Few granulomas may be present. No definite new focal infiltrate or pleural effusion. Cardiomediastinal silhouette grossly unremarkable. Aortic atherosclerosis. No pulmonary vascular congestion.       No active disease in the chest identified.     Signed by: Elroy Meza 1/11/2024 8:32 AM Dictation workstation:   [id number]    XR hip right with pelvis when performed 2 or 3 views    Result Date: 1/9/2024  Interpreted By:  Gregg Gonsales, STUDY: XR HIP RIGHT WITH PELVIS WHEN PERFORMED 2 OR 3 VIEWS; ;  1/9/2024 4:21 pm   INDICATION: Signs/Symptoms:post op R hip jenny in RR (AP/lat).   COMPARISON: Right hip radiographs dated 01/08/2024.   ACCESSION NUMBER(S): [id number]   ORDERING CLINICIAN: CONCEPCION PALOMAERS   FINDINGS: AP and cross-table view of the right hip. Interval postoperative change of right total hip arthroplasty. Arthroplasty components appear intact with near-anatomic alignment. There is no periprosthetic lucency. There is no interval acute fracture. Left dynamic hip pin screw is unchanged in appearance. Postoperative gas and soft tissue swelling surrounding the right hip joint. Skin staples noted.       Postoperative change of right hip arthroplasty with no radiographic apparent hardware complication or acute osseous abnormality.   Unchanged appearance of previous left dynamic hip pin screw.   MACRO: None   Signed by: Gregg Gonsales 1/9/2024 4:29 PM Dictation workstation:   [id number]             Assessment/Plan   Ms Sims is an 87yo woman with hypothyroidism, TIA (remote), hx PMR admitted with femur fracture in the setting of mechanical fall. She is s/p R hemiarthroplasty with Dr Barros 1/9. Course c/b post op fever and acute blood loss anemia.      #Displaced subcapital/mid cervical fracture of the right femur s/p R hemiarthroplasty with Dr Barros 1/9  -analgesia with tylenol, oxy, dilaudid for breakthrough  -discontinue flexeril d/t age and relative comfort  -bowel regimen  -PT/OT     #acute anemia  -likely post-op blood loss  -transfuse 1 unit - follow up repeat CBC  -continue holding ASA, lovenox    #fever - noted yesterday, low grade temperature overnight. Asymptomatic  -may be post op, however patient is at risk for infection  -CXR without infiltrate  -follow up UA/Ucx  -follow up blood culture, although very low suspicion bacteremia  -COVID/flu negative  -IS     #hypothyroidism  -cont levothyroxine     #hx TIA  -holding ASA d/t saturated dressing - resume once OK per ortho     #HTN  -cont amlodipine      FEN/GI: NPO for OR  Access: PIV  Ppx: holding per ortho - resume once OK per them   Code: full     Dispo: Denied precert for [facility]. P2P called 1/11 - Denial upheld. Expedited appeal filed.       Malnutrition Diagnosis Status: New  Malnutrition Diagnosis: Moderate malnutrition related to starvation  As Evidenced by: moderate muscle wasting, moderate subcutaneous fat loss, pt consuming <75% of estimated energy needs x 6 months and unintentional wt loss of 20% x 1 year.  I agree with the dietitian's malnutrition diagnosis.         Maribell Mcqueen MD

## 2024-01-11 NOTE — PROGRESS NOTES
"Lona Sims is a 88 y.o. female on day 3 of admission presenting with Oth fracture of unsp femur, init encntr for closed fracture (CMS/HCC).    Subjective   Lying in bed upon entering room.  Currently verbalizing no complaints.  Pain controlled with current pain regime.  No complaints of chest pain, shortness of breath, lightheaded or dizziness.  Tolerating a diet with no nausea vomiting.  Voiding well.  Patient aware of low hemoglobin level and the need for transfusion.       Objective     Physical Exam  Constitutional:       Appearance: Normal appearance.   HENT:      Head: Normocephalic and atraumatic.      Nose: Nose normal.      Mouth/Throat:      Mouth: Mucous membranes are moist.   Cardiovascular:      Rate and Rhythm: Normal rate and regular rhythm.   Pulmonary:      Effort: Pulmonary effort is normal.   Abdominal:      General: Bowel sounds are normal.      Palpations: Abdomen is soft.   Musculoskeletal:      Cervical back: Neck supple.      Comments: Right hip with Aquacel dressing in place some central staining, distally sensation present in l right lower extremity, plantar and dorsiflexion against resistance present in right foot.  Aquacel dressing removed large clot extending the entire length of dressing beneath with 1 small area of bright red oozing, surgical area cleansed with chlorhexidine and pressure dressing placed   Skin:     General: Skin is warm and dry.   Neurological:      General: No focal deficit present.      Mental Status: She is alert and oriented to person, place, and time.   Psychiatric:         Mood and Affect: Mood normal.         Last Recorded Vitals  Blood pressure 119/51, pulse 83, temperature 37.5 °C (99.5 °F), temperature source Temporal, resp. rate 16, height 1.473 m (4' 10\"), weight (!) 36 kg (79 lb 5.9 oz), SpO2 100 %.  Intake/Output last 3 Shifts:  I/O last 3 completed shifts:  In: 1820 (50.6 mL/kg) [P.O.:720; I.V.:1000 (27.8 mL/kg); IV Piggyback:100]  Out: 800 (22.2 " mL/kg) [Urine:800 (0.6 mL/kg/hr)]  Weight: 36 kg     Relevant Results  Results for orders placed or performed during the hospital encounter of 01/08/24 (from the past 24 hour(s))   CBC   Result Value Ref Range    WBC 13.1 (H) 4.4 - 11.3 x10*3/uL    nRBC 0.0 0.0 - 0.0 /100 WBCs    RBC 2.68 (L) 4.00 - 5.20 x10*6/uL    Hemoglobin 8.0 (L) 12.0 - 16.0 g/dL    Hematocrit 25.3 (L) 36.0 - 46.0 %    MCV 94 80 - 100 fL    MCH 29.9 26.0 - 34.0 pg    MCHC 31.6 (L) 32.0 - 36.0 g/dL    RDW 13.2 11.5 - 14.5 %    Platelets 129 (L) 150 - 450 x10*3/uL   Basic metabolic panel   Result Value Ref Range    Glucose 149 (H) 74 - 99 mg/dL    Sodium 133 (L) 136 - 145 mmol/L    Potassium 4.3 3.5 - 5.3 mmol/L    Chloride 97 (L) 98 - 107 mmol/L    Bicarbonate 30 21 - 32 mmol/L    Anion Gap 10 10 - 20 mmol/L    Urea Nitrogen 17 6 - 23 mg/dL    Creatinine 0.74 0.50 - 1.05 mg/dL    eGFR 78 >60 mL/min/1.73m*2    Calcium 8.1 (L) 8.6 - 10.3 mg/dL   CBC   Result Value Ref Range    WBC 15.1 (H) 4.4 - 11.3 x10*3/uL    nRBC 0.0 0.0 - 0.0 /100 WBCs    RBC 2.22 (L) 4.00 - 5.20 x10*6/uL    Hemoglobin 6.6 (L) 12.0 - 16.0 g/dL    Hematocrit 20.9 (L) 36.0 - 46.0 %    MCV 94 80 - 100 fL    MCH 29.7 26.0 - 34.0 pg    MCHC 31.6 (L) 32.0 - 36.0 g/dL    RDW 13.2 11.5 - 14.5 %    Platelets 111 (L) 150 - 450 x10*3/uL   Renal function panel   Result Value Ref Range    Glucose 99 74 - 99 mg/dL    Sodium 132 (L) 136 - 145 mmol/L    Potassium 4.5 3.5 - 5.3 mmol/L    Chloride 99 98 - 107 mmol/L    Bicarbonate 29 21 - 32 mmol/L    Anion Gap 9 (L) 10 - 20 mmol/L    Urea Nitrogen 14 6 - 23 mg/dL    Creatinine 0.56 0.50 - 1.05 mg/dL    eGFR 88 >60 mL/min/1.73m*2    Calcium 8.0 (L) 8.6 - 10.3 mg/dL    Phosphorus 2.1 (L) 2.5 - 4.9 mg/dL    Albumin 2.6 (L) 3.4 - 5.0 g/dL   VERIFY ABO/Rh Group Test   Result Value Ref Range    ABO TYPE B     Rh TYPE POS    Prepare RBC: 1 Units   Result Value Ref Range    PRODUCT CODE I5845J79     Unit Number [id number]     Unit ABO O     Unit  RH POS     XM INTEP COMP     Dispense Status IS     Blood Expiration Date January 23, 2024 23:59 EST     PRODUCT BLOOD TYPE 5100     UNIT VOLUME 350    Sars-CoV-2 and Influenza A/B PCR   Result Value Ref Range    Flu A Result Not Detected Not Detected    Flu B Result Not Detected Not Detected    Coronavirus 2019, PCR Not Detected Not Detected     Scheduled medications  acetaminophen, 650 mg, oral, q6h VICTORIA  amLODIPine, 5 mg, oral, Daily  [Held by provider] aspirin, 81 mg, oral, Daily  atorvastatin, 40 mg, oral, Nightly  cephalexin, 500 mg, oral, q8h VICTORIA  docusate sodium, 100 mg, oral, BID  [Held by provider] enoxaparin, 40 mg, subcutaneous, Daily  levothyroxine, 75 mcg, oral, Daily before breakfast      Continuous medications     PRN medications  PRN medications: benzocaine-menthol, bisacodyl, diphenhydrAMINE, HYDROmorphone, naloxone, naloxone, naloxone, ondansetron ODT **OR** ondansetron, oxyCODONE, prochlorperazine **OR** prochlorperazine **OR** prochlorperazine    Assessment/Plan   Principal Problem:    Oth fracture of unsp femur, init encntr for closed fracture (CMS/HCC)  Active Problems:    Closed fracture of neck of right femur with malunion    Weakness    Ambulatory dysfunction    Closed fracture of neck of right femur (CMS/HCC)    Postop day 2 of right hemiarthroplasty  Physical and Occupational Therapy are on consult  Weightbearing as tolerated with walker  Currently medications for VTE prophylaxis are on hold, patient with acute blood loss anemia, hemoglobin 6.6 today, mildly hypotensive but asymptomatic, transfusing 1 unit of packed red blood cells, continue to monitor hemoglobin and hematocrit  Labs reviewed  Multimodal pain regime  Home medications ordered for chronic medical conditions as appropriate  Bowel regime  Encourage incentive spirometry  Plans on discharge to to acute rehab, pending insurance authorization  Follow-up with Dr. Barros as directed       I spent 35 minutes in the professional and  overall care of this patient.      Kelin Tapia PA-C

## 2024-01-11 NOTE — CARE PLAN
The patient's goals for the shift include      The clinical goals for the shift include See plan of care      Problem: Fall/Injury  Goal: Not fall by end of shift  Outcome: Progressing  Goal: Be free from injury by end of the shift  Outcome: Progressing  Goal: Verbalize understanding of personal risk factors for fall in the hospital  Outcome: Progressing  Goal: Verbalize understanding of risk factor reduction measures to prevent injury from fall in the home  Outcome: Progressing  Goal: Use assistive devices by end of the shift  Outcome: Progressing  Goal: Pace activities to prevent fatigue by end of the shift  Outcome: Progressing     Problem: Skin  Goal: Decreased wound size/increased tissue granulation at next dressing change  Outcome: Progressing  Goal: Participates in plan/prevention/treatment measures  1/11/2024 0529 by Angela Mcdaniel RN  Outcome: Progressing  1/11/2024 0529 by Angela Mcdaniel RN  Flowsheets (Taken 1/10/2024 0814 by Kinza Luna LPN)  Participates in plan/prevention/treatment measures: Elevate heels  Goal: Prevent/manage excess moisture  Outcome: Progressing  Goal: Prevent/minimize sheer/friction injuries  Outcome: Progressing  Goal: Promote/optimize nutrition  Outcome: Progressing  Goal: Promote skin healing  1/11/2024 0529 by Angela Mcdaniel RN  Outcome: Progressing  1/11/2024 0529 by Angela Mcdaniel RN  Flowsheets (Taken 1/10/2024 0814 by Kinza Luna LPN)  Promote skin healing: Protective dressings over bony prominences     Problem: Pain  Goal: Takes deep breaths with improved pain control throughout the shift  Outcome: Progressing  Goal: Turns in bed with improved pain control throughout the shift  Outcome: Progressing  Goal: Walks with improved pain control throughout the shift  Outcome: Progressing  Goal: Performs ADL's with improved pain control throughout shift  Outcome: Progressing  Goal: Participates in PT with improved pain control throughout the shift  Outcome:  Progressing  Goal: Free from opioid side effects throughout the shift  Outcome: Progressing  Goal: Free from acute confusion related to pain meds throughout the shift  Outcome: Progressing     Problem: Pain - Adult  Goal: Verbalizes/displays adequate comfort level or baseline comfort level  Outcome: Progressing     Problem: Safety - Adult  Goal: Free from fall injury  Outcome: Progressing     Problem: Discharge Planning  Goal: Discharge to home or other facility with appropriate resources  Outcome: Progressing     Problem: Chronic Conditions and Co-morbidities  Goal: Patient's chronic conditions and co-morbidity symptoms are monitored and maintained or improved  Outcome: Progressing     Problem: Dressings Lower Extremities  Goal: STG - Patient to complete lower body dressing min assist  Outcome: Progressing     Problem: Grooming  Goal: STG - Patient completes grooming SUP  Outcome: Progressing  Goal: STG - Patient will tolerate standing 2-4 min  Outcome: Progressing     Problem: Nutrition  Goal: Oral intake greater 75%  Outcome: Progressing  Goal: Consume prescribed supplement  Outcome: Progressing  Goal: Promote healing  Outcome: Progressing  Goal: Gradual weight gain  Outcome: Progressing     Patient has had an uneventful night. Patient verbalized that scheduled tylenol was controlling her pain. Patient vital signs stable. Patient safety maintained.

## 2024-01-11 NOTE — PROGRESS NOTES
Physical Therapy    Physical Therapy Treatment    Patient Name: Lona Sims  MRN: [id number]  Today's Date: 1/11/2024  Time Calculation  Start Time: 0920  Stop Time: 0950  Time Calculation (min): 30 min       Assessment/Plan   PT Assessment  PT Assessment Results: Decreased strength, Decreased endurance, Impaired balance, Decreased mobility  Rehab Prognosis: Good  End of Session Communication: Bedside nurse  End of Session Patient Position: Up in chair, Alarm on  PT Plan  Inpatient/Swing Bed or Outpatient: Inpatient  PT Plan  Treatment/Interventions: Positioning, Strengthening  PT Plan: Skilled PT  PT Frequency: Daily  PT Discharge Recommendations: Moderate intensity level of continued care  PT Recommended Transfer Status: Assist x2       01/11/24 0920   PT  Visit   PT Received On 01/11/24   Response to Previous Treatment Patient with no complaints from previous session.   General   Prior to Session Communication Bedside nurse   Patient Position Received Alarm on;Up in chair   Preferred Learning Style verbal;visual   General Comment in room intially earlier in the AM for patient repositioning as patient was attempting to eat from supine position   Precautions   Medical Precautions Fall precautions   Post-Surgical Precautions Right hip precautions   Pain Assessment   Pain Assessment 0-10   Pain Score 5 - Moderate pain   Pain Type Surgical pain   Pain Location Hip   Pain Orientation Right   Pain Descriptors Aching   Cognition   Overall Cognitive Status WFL   Orientation Level Oriented X4   Therapeutic Exercise   Therapeutic Exercise Performed Yes   Therapeutic Exercise Activity 1 marches x15   Therapeutic Exercise Activity 2 LAQ x10 (AAROM R)   Therapeutic Exercise Activity 3 resisted foot press x10   Therapeutic Exercise Activity 4 heel slides x10 (AAROM R)   Therapeutic Exercise Activity 5 hip ABD x10 (AAROM R)   Therapeutic Exercise Activity 6 pillow squeeze x10   Activity Tolerance   Endurance Tolerates 10 - 20  min exercise with multiple rests   PT Assessment   PT Assessment Results Decreased strength;Decreased endurance;Impaired balance;Decreased mobility   Rehab Prognosis Good   End of Session Communication Bedside nurse   End of Session Patient Position Up in chair;Alarm on   Outpatient Education   Individual(s) Educated Patient   Education Provided Fall Risk   PT Plan   Inpatient/Swing Bed or Outpatient Inpatient   PT Plan   Treatment/Interventions Positioning;Strengthening   PT Plan Skilled PT   PT Frequency Daily   PT Discharge Recommendations Moderate intensity level of continued care   PT Recommended Transfer Status Assist x2     Outcome Measures:  [facility] Basic Mobility  Turning from your back to your side while in a flat bed without using bedrails: A lot  Moving from lying on your back to sitting on the side of a flat bed without using bedrails: A lot  Moving to and from bed to chair (including a wheelchair): A lot  Standing up from a chair using your arms (e.g. wheelchair or bedside chair): A lot  To walk in hospital room: A lot  Climbing 3-5 steps with railing: Total  Basic Mobility - Total Score: 11      EDUCATION:  Outpatient Education  Individual(s) Educated: Patient  Education Provided: Fall Risk  Education Documentation  Precautions, taught by Pb Fong PTA at 1/11/2024  9:55 AM.  Learner: Patient  Readiness: Acceptance  Method: Explanation  Response: Verbalizes Understanding, Needs Reinforcement    Mobility Training, taught by Pb Fong PTA at 1/11/2024  9:55 AM.  Learner: Patient  Readiness: Acceptance  Method: Explanation  Response: Verbalizes Understanding, Needs Reinforcement    GOALS:  Encounter Problems       Encounter Problems (Active)       PT Problem       PT Goal 1 (Progressing)       Start:  01/10/24    Expected End:  01/24/24       Pt able to perform bed mobility with mod assist.           PT Goal 2 (Progressing)       Start:  01/10/24    Expected End:  01/24/24       Pt able to complete  all transfers with mod assist.            PT Goal 3 (Progressing)       Start:  01/10/24    Expected End:  01/24/24       Pt able to ambulate 20 feet with quad cane and mod assist of 2.              Pain - Adult          Transfers       STG - Patient will perform toilet transfer min assist (Progressing)       Start:  01/10/24    Expected End:  01/24/24            STG - Patient will follow hip precautions during transfers (Progressing)       Start:  01/10/24    Expected End:  01/24/24

## 2024-01-11 NOTE — PROGRESS NOTES
Occupational Therapy    OT Treatment    Patient Name: Lona Sims  MRN: [id number]  Today's Date: 1/11/2024  Time Calculation  Start Time: 0859  Stop Time: 0939  Time Calculation (min): 40 min         Assessment:  OT Assessment: Pt. presents with decreased balance and endurance impacting pt. ability to safely complete ADLs and mobility independently. Pt. benefits from moderate intensity therapy to return to PLOF  Prognosis: Fair  Barriers to Discharge: Decreased caregiver support  End of Session Communication: Bedside nurse  OT Assessment Results: Decreased ADL status, Decreased functional mobility, Decreased upper extremity range of motion, Decreased upper extremity strength  Prognosis: Fair  Barriers to Discharge: Decreased caregiver support    Plan:  Treatment Interventions: ADL retraining, Functional transfer training  OT Frequency: 5 times per week  OT Discharge Recommendations: Moderate intensity level of continued care  Treatment Interventions: ADL retraining, Functional transfer training  Subjective     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  Pt admitted 1/9/24 s/p fall with R femur fracture.  1/9/24 s/p R hip hemiarthroplasty.  Prior to Session Communication: Bedside nurse  Patient Position Received: Bed, 3 rail up  Preferred Learning Style: verbal, visual  General Comment: pt agreeable to therapy    Vital Signs:       Pain:  Pain Assessment  Pain Assessment: 0-10  Pain Score: 5 - Moderate pain  Pain Type: Surgical pain  Pain Location: Hip  Pain Orientation: Right  Pain Descriptors: Aching  Objective      Activities of Daily Living:    Grooming  Grooming Level of Assistance: Setup  Grooming Where Assessed: Edge of bed  Grooming Comments: washed face  UE Bathing  UE Bathing Where Assessed: Edge of bed  UE Bathing Comments: pt washed uner arms and breasts, assist for seated balance  LE Bathing  LE Bathing Level of Assistance: Moderate assistance  LE Bathing Where Assessed: Edge of bed  LE Bathing Comments: Pt washed upper thighs up to knees.  UE Dressing  UE Dressing Level of Assistance: Moderate assistance  UE Dressing Where Assessed: Edge of bed  UE Dressing Comments: changed gown, Pt has difficulty with bilateral hand manipulatioin due to old stroke effecting RUE     Toileting  Toileting Comments: pt using pure wick    Functional Standing Tolerance:       Bed Mobility/Transfers: Bed Mobility  Bed Mobility:  (max A  to EOB)  Transfers  Transfer:  (bed to chair, stand pivot, max A)                  Outcome Measures:[facility] Daily Activity  Putting on and taking off regular lower body clothing: A lot  Bathing (including washing, rinsing, drying): A lot  Putting on and taking off regular upper body clothing: A lot  Toileting, which includes using toilet, bedpan or  urinal: A lot  Taking care of personal grooming such as brushing teeth: A little  Eating Meals: A little  Daily Activity - Total Score: 14        EDUCATION:  Education  Individual(s) Educated: Patient  Education Provided: Fall precautons, Risk and benefits of OT discussed with patient or other, POC discussed and agreed upon  Patient Response to Education: Patient/Caregiver Verbalized Understanding of Information  Education Comment: Pt would benefit from contined reinforcement    Goals:  Encounter Problems       Encounter Problems (Active)       Dressings Lower Extremities       STG - Patient to complete lower body dressing min assist (Progressing)       Start:  01/10/24    Expected End:  01/24/24               Grooming       STG - Patient completes grooming SUP (Progressing)       Start:  01/10/24    Expected End:  01/24/24            STG - Patient will tolerate standing 2-4 min (Progressing)       Start:  01/10/24    Expected End:  01/24/24               Transfers       STG - Patient will perform toilet transfer min assist (Progressing)       Start:  01/10/24    Expected End:  01/24/24            STG - Patient will follow hip precautions during transfers (Progressing)       Start:  01/10/24    Expected End:  01/24/24

## 2024-01-12 ENCOUNTER — APPOINTMENT (OUTPATIENT)
Dept: CARDIOLOGY | Facility: HOSPITAL | Age: 89
DRG: 522 | End: 2024-01-12
Payer: MEDICARE

## 2024-01-12 LAB
ALBUMIN SERPL BCP-MCNC: 2.6 G/DL (ref 3.4–5)
ANION GAP SERPL CALC-SCNC: 9 MMOL/L (ref 10–20)
BACTERIA UR CULT: NORMAL
BUN SERPL-MCNC: 17 MG/DL (ref 6–23)
CALCIUM SERPL-MCNC: 8.1 MG/DL (ref 8.6–10.3)
CHLORIDE SERPL-SCNC: 99 MMOL/L (ref 98–107)
CO2 SERPL-SCNC: 29 MMOL/L (ref 21–32)
CREAT SERPL-MCNC: 0.53 MG/DL (ref 0.5–1.05)
EGFRCR SERPLBLD CKD-EPI 2021: 89 ML/MIN/1.73M*2
ERYTHROCYTE [DISTWIDTH] IN BLOOD BY AUTOMATED COUNT: 22.1 % (ref 11.5–14.5)
GLUCOSE SERPL-MCNC: 92 MG/DL (ref 74–99)
HCT VFR BLD AUTO: 25.7 % (ref 36–46)
HGB BLD-MCNC: 8.2 G/DL (ref 12–16)
MCH RBC QN AUTO: 27 PG (ref 26–34)
MCHC RBC AUTO-ENTMCNC: 31.9 G/DL (ref 32–36)
MCV RBC AUTO: 85 FL (ref 80–100)
NRBC BLD-RTO: 0 /100 WBCS (ref 0–0)
PHOSPHATE SERPL-MCNC: 1.6 MG/DL (ref 2.5–4.9)
PLATELET # BLD AUTO: 142 X10*3/UL (ref 150–450)
POTASSIUM SERPL-SCNC: 4.2 MMOL/L (ref 3.5–5.3)
RBC # BLD AUTO: 3.04 X10*6/UL (ref 4–5.2)
SODIUM SERPL-SCNC: 133 MMOL/L (ref 136–145)
WBC # BLD AUTO: 14 X10*3/UL (ref 4.4–11.3)

## 2024-01-12 PROCEDURE — 2500000001 HC RX 250 WO HCPCS SELF ADMINISTERED DRUGS (ALT 637 FOR MEDICARE OP): Performed by: PHYSICIAN ASSISTANT

## 2024-01-12 PROCEDURE — 82565 ASSAY OF CREATININE: CPT | Performed by: STUDENT IN AN ORGANIZED HEALTH CARE EDUCATION/TRAINING PROGRAM

## 2024-01-12 PROCEDURE — 2500000001 HC RX 250 WO HCPCS SELF ADMINISTERED DRUGS (ALT 637 FOR MEDICARE OP): Performed by: ORTHOPAEDIC SURGERY

## 2024-01-12 PROCEDURE — 97110 THERAPEUTIC EXERCISES: CPT | Mod: GP,CQ

## 2024-01-12 PROCEDURE — 93970 EXTREMITY STUDY: CPT

## 2024-01-12 PROCEDURE — 99239 HOSP IP/OBS DSCHRG MGMT >30: CPT | Performed by: STUDENT IN AN ORGANIZED HEALTH CARE EDUCATION/TRAINING PROGRAM

## 2024-01-12 PROCEDURE — 99233 SBSQ HOSP IP/OBS HIGH 50: CPT | Performed by: PHYSICIAN ASSISTANT

## 2024-01-12 PROCEDURE — 2500000004 HC RX 250 GENERAL PHARMACY W/ HCPCS (ALT 636 FOR OP/ED): Performed by: STUDENT IN AN ORGANIZED HEALTH CARE EDUCATION/TRAINING PROGRAM

## 2024-01-12 PROCEDURE — 93970 EXTREMITY STUDY: CPT | Performed by: INTERNAL MEDICINE

## 2024-01-12 PROCEDURE — 85027 COMPLETE CBC AUTOMATED: CPT | Performed by: STUDENT IN AN ORGANIZED HEALTH CARE EDUCATION/TRAINING PROGRAM

## 2024-01-12 PROCEDURE — 1100000001 HC PRIVATE ROOM DAILY

## 2024-01-12 PROCEDURE — 97116 GAIT TRAINING THERAPY: CPT | Mod: GP,CQ

## 2024-01-12 PROCEDURE — 97535 SELF CARE MNGMENT TRAINING: CPT | Mod: GO

## 2024-01-12 PROCEDURE — 36415 COLL VENOUS BLD VENIPUNCTURE: CPT | Performed by: STUDENT IN AN ORGANIZED HEALTH CARE EDUCATION/TRAINING PROGRAM

## 2024-01-12 PROCEDURE — 2500000001 HC RX 250 WO HCPCS SELF ADMINISTERED DRUGS (ALT 637 FOR MEDICARE OP): Performed by: STUDENT IN AN ORGANIZED HEALTH CARE EDUCATION/TRAINING PROGRAM

## 2024-01-12 RX ORDER — ONDANSETRON 4 MG/1
4 TABLET, ORALLY DISINTEGRATING ORAL EVERY 8 HOURS PRN
Qty: 20 TABLET | Refills: 0 | Status: SHIPPED
Start: 2024-01-12

## 2024-01-12 RX ORDER — CEPHALEXIN 500 MG/1
500 CAPSULE ORAL EVERY 6 HOURS SCHEDULED
Status: DISCONTINUED | OUTPATIENT
Start: 2024-01-12 | End: 2024-01-13 | Stop reason: HOSPADM

## 2024-01-12 RX ORDER — OXYCODONE HYDROCHLORIDE 5 MG/1
5 TABLET ORAL EVERY 8 HOURS PRN
Start: 2024-01-12 | End: 2024-01-15

## 2024-01-12 RX ORDER — CEPHALEXIN 500 MG/1
500 CAPSULE ORAL EVERY 6 HOURS SCHEDULED
Qty: 20 CAPSULE | Refills: 0 | Status: SHIPPED | OUTPATIENT
Start: 2024-01-13 | End: 2024-01-18

## 2024-01-12 RX ADMIN — CEPHALEXIN 500 MG: 500 CAPSULE ORAL at 12:43

## 2024-01-12 RX ADMIN — ATORVASTATIN CALCIUM 40 MG: 40 TABLET, FILM COATED ORAL at 20:41

## 2024-01-12 RX ADMIN — LEVOTHYROXINE SODIUM 75 MCG: 75 TABLET ORAL at 06:18

## 2024-01-12 RX ADMIN — CEPHALEXIN 500 MG: 500 CAPSULE ORAL at 09:38

## 2024-01-12 RX ADMIN — DOCUSATE SODIUM 100 MG: 100 CAPSULE, LIQUID FILLED ORAL at 09:38

## 2024-01-12 RX ADMIN — AMLODIPINE BESYLATE 5 MG: 5 TABLET ORAL at 09:38

## 2024-01-12 RX ADMIN — ACETAMINOPHEN 650 MG: 325 TABLET ORAL at 00:00

## 2024-01-12 RX ADMIN — CEFTRIAXONE SODIUM 1 G: 1 INJECTION, SOLUTION INTRAVENOUS at 18:06

## 2024-01-12 RX ADMIN — ACETAMINOPHEN 650 MG: 325 TABLET ORAL at 06:18

## 2024-01-12 RX ADMIN — ACETAMINOPHEN 650 MG: 325 TABLET ORAL at 18:06

## 2024-01-12 RX ADMIN — DOCUSATE SODIUM 100 MG: 100 CAPSULE, LIQUID FILLED ORAL at 20:42

## 2024-01-12 RX ADMIN — ACETAMINOPHEN 650 MG: 325 TABLET ORAL at 12:43

## 2024-01-12 ASSESSMENT — COGNITIVE AND FUNCTIONAL STATUS - GENERAL
WALKING IN HOSPITAL ROOM: A LOT
DRESSING REGULAR UPPER BODY CLOTHING: A LITTLE
TURNING FROM BACK TO SIDE WHILE IN FLAT BAD: A LOT
MOBILITY SCORE: 11
MOVING FROM LYING ON BACK TO SITTING ON SIDE OF FLAT BED WITH BEDRAILS: A LOT
DRESSING REGULAR LOWER BODY CLOTHING: A LOT
MOVING FROM LYING ON BACK TO SITTING ON SIDE OF FLAT BED WITH BEDRAILS: A LOT
CLIMB 3 TO 5 STEPS WITH RAILING: TOTAL
HELP NEEDED FOR BATHING: A LOT
MOVING TO AND FROM BED TO CHAIR: A LOT
STANDING UP FROM CHAIR USING ARMS: A LOT
WALKING IN HOSPITAL ROOM: A LOT
DAILY ACTIVITIY SCORE: 16
TURNING FROM BACK TO SIDE WHILE IN FLAT BAD: A LOT
STANDING UP FROM CHAIR USING ARMS: A LOT
MOBILITY SCORE: 11
TOILETING: A LOT
PERSONAL GROOMING: A LITTLE
CLIMB 3 TO 5 STEPS WITH RAILING: TOTAL
MOVING TO AND FROM BED TO CHAIR: A LOT

## 2024-01-12 ASSESSMENT — PAIN DESCRIPTION - DESCRIPTORS: DESCRIPTORS: ACHING

## 2024-01-12 ASSESSMENT — ACTIVITIES OF DAILY LIVING (ADL): HOME_MANAGEMENT_TIME_ENTRY: 25

## 2024-01-12 ASSESSMENT — PAIN SCALES - GENERAL
PAINLEVEL_OUTOF10: 0 - NO PAIN
PAINLEVEL_OUTOF10: 5 - MODERATE PAIN

## 2024-01-12 ASSESSMENT — PAIN - FUNCTIONAL ASSESSMENT
PAIN_FUNCTIONAL_ASSESSMENT: 0-10
PAIN_FUNCTIONAL_ASSESSMENT: 0-10

## 2024-01-12 NOTE — CARE PLAN
The patient's goals for the shift include      The clinical goals for the shift include See plan of care      Problem: Fall/Injury  Goal: Not fall by end of shift  Outcome: Progressing  Goal: Be free from injury by end of the shift  Outcome: Progressing  Goal: Verbalize understanding of personal risk factors for fall in the hospital  Outcome: Progressing  Goal: Verbalize understanding of risk factor reduction measures to prevent injury from fall in the home  Outcome: Progressing  Goal: Use assistive devices by end of the shift  Outcome: Progressing  Goal: Pace activities to prevent fatigue by end of the shift  Outcome: Progressing     Problem: Skin  Goal: Decreased wound size/increased tissue granulation at next dressing change  Outcome: Progressing  Goal: Participates in plan/prevention/treatment measures  Outcome: Progressing  Goal: Prevent/manage excess moisture  Outcome: Progressing  Goal: Prevent/minimize sheer/friction injuries  Outcome: Progressing  Goal: Promote/optimize nutrition  Outcome: Progressing  Goal: Promote skin healing  Outcome: Progressing     Problem: Pain  Goal: Takes deep breaths with improved pain control throughout the shift  Outcome: Progressing  Goal: Turns in bed with improved pain control throughout the shift  Outcome: Progressing  Goal: Walks with improved pain control throughout the shift  Outcome: Progressing  Goal: Performs ADL's with improved pain control throughout shift  Outcome: Progressing  Goal: Participates in PT with improved pain control throughout the shift  Outcome: Progressing  Goal: Free from opioid side effects throughout the shift  Outcome: Progressing  Goal: Free from acute confusion related to pain meds throughout the shift  Outcome: Progressing     Problem: Pain - Adult  Goal: Verbalizes/displays adequate comfort level or baseline comfort level  Outcome: Progressing     Problem: Safety - Adult  Goal: Free from fall injury  Outcome: Progressing     Problem: Discharge  Planning  Goal: Discharge to home or other facility with appropriate resources  Outcome: Progressing     Problem: Chronic Conditions and Co-morbidities  Goal: Patient's chronic conditions and co-morbidity symptoms are monitored and maintained or improved  Outcome: Progressing     Problem: Dressings Lower Extremities  Goal: STG - Patient to complete lower body dressing min assist  Outcome: Progressing     Problem: Grooming  Goal: STG - Patient completes grooming SUP  Outcome: Progressing  Goal: STG - Patient will tolerate standing 2-4 min  Outcome: Progressing     Problem: Nutrition  Goal: Oral intake greater 75%  Outcome: Progressing  Goal: Consume prescribed supplement  Outcome: Progressing  Goal: Promote healing  Outcome: Progressing  Goal: Gradual weight gain  Outcome: Progressing     Patient had an uneventful night. Patient with controlled pain. Patient vital signs stable. Patient safety maintained.

## 2024-01-12 NOTE — PROGRESS NOTES
Occupational Therapy    OT Treatment    Patient Name: Lona Sims  MRN: [id number]  Today's Date: 1/12/2024  Time Calculation  Start Time: 1150  Stop Time: 1215  Time Calculation (min): 25 min         Assessment:  Prognosis: Fair  End of Session Communication: Bedside nurse  End of Session Patient Position: Up in chair, Alarm on  Prognosis: Fair  Plan:  Treatment Interventions: ADL retraining, Functional transfer training  OT Frequency: 5 times per week  OT Discharge Recommendations: Moderate intensity level of continued care  OT Recommended Transfer Status: Assist of 2  OT - OK to Discharge: Yes (Next level of care when cleared by medical team)  Treatment Interventions: ADL retraining, Functional transfer training    Subjective   Previous Visit Info:  OT Last Visit  OT Received On: 01/12/24  General:  General  Prior to Session Communication: Bedside nurse  Patient Position Received: Bed, 2 rail up, Alarm on  General Comment: patient pleasant and cooperative throughout, agreeable to OT session  Precautions:  Precautions Comment: (R) LE WBAT, (R) hip precautions maintained throughout    Pain:  Pain Assessment  Pain Assessment: 0-10  Pain Score: 0 - No pain    Objective    Cognition:  Cognition  Overall Cognitive Status: Within Functional Limits    Activities of Daily Living:    LE Bathing  LE Bathing Comments: patient declining LB dressing this date    Toileting  Toileting Comments: completing with MIN A for steadying during clothing management in standing; patient able to complete periarea hygiene while seated through lateral weight shifting; completing on BSC     Bed Mobility/Transfers: Bed Mobility  Bed Mobility: Yes  Bed Mobility 1  Bed Mobility Comments 1: MOD A for lifting trunk and scooting hips towards EOB; patient with impaired functional use of (R) hand limiting her ability to use bed rail appropriately    Transfers  Transfer: Yes (completing squat/pivot from EOB to BSC however does not achieve fully  errect standing position but more so completes squat pivot to BSC; patient able to maintain precautions throughout; patient then completing squat/pivot from BSC to recliner with MIN A with cues for hand placement and overall sequencing; patient limited by posturing with transfers and inability to use (R) hand functionally on FWW       Outcome Measures:[facility] Daily Activity  Putting on and taking off regular lower body clothing: A lot  Bathing (including washing, rinsing, drying): A lot  Putting on and taking off regular upper body clothing: A little  Toileting, which includes using toilet, bedpan or urinal: A lot  Taking care of personal grooming such as brushing teeth: A little  Eating Meals: None  Daily Activity - Total Score: 16        Education Documentation  Body Mechanics, taught by Bette Jansen OT at 1/12/2024  1:29 PM.  Learner: Patient  Readiness: Acceptance  Method: Explanation  Response: Verbalizes Understanding    Precautions, taught by Bette Jansen OT at 1/12/2024  1:29 PM.  Learner: Patient  Readiness: Acceptance  Method: Explanation  Response: Verbalizes Understanding    ADL Training, taught by Bette Jansen OT at 1/12/2024  1:29 PM.  Learner: Patient  Readiness: Acceptance  Method: Explanation  Response: Verbalizes Understanding    Education Comments  No comments found.        OP EDUCATION:  Education  Individual(s) Educated: Patient  Education Provided: Diagnosis & Precautions, Ergonomics and postural realignment, Joint protection and energy conservation, Fall precautons  Patient Response to Education: Patient/Caregiver Verbalized Understanding of Information    Goals:  Encounter Problems       Encounter Problems (Active)       Dressings Lower Extremities       STG - Patient to complete lower body dressing min assist (Progressing)       Start:  01/10/24    Expected End:  01/24/24               Grooming       STG - Patient completes grooming SUP (Progressing)       Start:  01/10/24     Expected End:  01/24/24            STG - Patient will tolerate standing 2-4 min (Progressing)       Start:  01/10/24    Expected End:  01/24/24               Transfers       STG - Patient will perform toilet transfer min assist (Progressing)       Start:  01/10/24    Expected End:  01/24/24            STG - Patient will follow hip precautions during transfers (Progressing)       Start:  01/10/24    Expected End:  01/24/24

## 2024-01-12 NOTE — PROGRESS NOTES
Patient has insurance authorization for [facility]. Notified physician.     1603- Per physician, patient will be ready to d/c- stretcher transport is arranged for 10am tomorrow. Notified facility and nursing. Updated patient at bedside. Nursing to call pt's daughter.     Leeann Nelson RN

## 2024-01-12 NOTE — NURSING NOTE
Patient has had no drainage noted to the right thigh dressing this shift.  She was up to the chair with assist x2.  Duplex of RLE completed.  Plan is for discharge tomorrow. IV antibiotics continued until discharge.

## 2024-01-12 NOTE — PROGRESS NOTES
"Lona Sims is a 88 y.o. female on day 4 of admission presenting with Oth fracture of unsp femur, init encntr for closed fracture (CMS/HCC).    Subjective   Sitting up in bed upon entering room.  Currently verbalizing no complaints.  States little to no pain when she is sitting still however with any movement pain increases considerably.  Feeling much less lethargic today and eager to participate in therapy.       Objective     Physical Exam  Constitutional:       Appearance: Normal appearance.   HENT:      Head: Normocephalic and atraumatic.      Nose: Nose normal.      Mouth/Throat:      Mouth: Mucous membranes are moist.   Cardiovascular:      Rate and Rhythm: Normal rate and regular rhythm.   Pulmonary:      Effort: Pulmonary effort is normal.      Breath sounds: Normal breath sounds.   Abdominal:      General: Abdomen is flat. Bowel sounds are normal.      Palpations: Abdomen is soft.   Musculoskeletal:      Cervical back: Neck supple.      Comments: Bulky dressing removed from right hip, very scant amount of bright red bleeding from central incisional area, some surrounding supple edema, distally sensation is present throughout right lower extremity, plantar and dorsiflexion against resistance in right foot   Skin:     General: Skin is warm and dry.   Neurological:      General: No focal deficit present.      Mental Status: She is alert and oriented to person, place, and time.   Psychiatric:         Mood and Affect: Mood normal.         Last Recorded Vitals  Blood pressure 112/56, pulse 80, temperature 36.6 °C (97.9 °F), resp. rate 16, height 1.473 m (4' 10\"), weight (!) 36 kg (79 lb 5.9 oz), SpO2 96 %.  Intake/Output last 3 Shifts:  I/O last 3 completed shifts:  In: 2732.5 (75.9 mL/kg) [P.O.:480; I.V.:1000 (27.8 mL/kg); Blood:252.5; IV Piggyback:1000]  Out: 1551 (43.1 mL/kg) [Urine:1551 (1.2 mL/kg/hr)]  Weight: 36 kg     Relevant Results  Results for orders placed or performed during the hospital encounter " of 01/08/24 (from the past 24 hour(s))   CBC   Result Value Ref Range    WBC 14.0 (H) 4.4 - 11.3 x10*3/uL    nRBC 0.0 0.0 - 0.0 /100 WBCs    RBC 3.04 (L) 4.00 - 5.20 x10*6/uL    Hemoglobin 8.2 (L) 12.0 - 16.0 g/dL    Hematocrit 25.7 (L) 36.0 - 46.0 %    MCV 85 80 - 100 fL    MCH 27.0 26.0 - 34.0 pg    MCHC 31.9 (L) 32.0 - 36.0 g/dL    RDW 22.1 (H) 11.5 - 14.5 %    Platelets 142 (L) 150 - 450 x10*3/uL   Renal function panel   Result Value Ref Range    Glucose 92 74 - 99 mg/dL    Sodium 133 (L) 136 - 145 mmol/L    Potassium 4.2 3.5 - 5.3 mmol/L    Chloride 99 98 - 107 mmol/L    Bicarbonate 29 21 - 32 mmol/L    Anion Gap 9 (L) 10 - 20 mmol/L    Urea Nitrogen 17 6 - 23 mg/dL    Creatinine 0.53 0.50 - 1.05 mg/dL    eGFR 89 >60 mL/min/1.73m*2    Calcium 8.1 (L) 8.6 - 10.3 mg/dL    Phosphorus 1.6 (L) 2.5 - 4.9 mg/dL    Albumin 2.6 (L) 3.4 - 5.0 g/dL     Scheduled medications  acetaminophen, 650 mg, oral, q6h VICTORIA  amLODIPine, 5 mg, oral, Daily  [Held by provider] aspirin, 81 mg, oral, Daily  atorvastatin, 40 mg, oral, Nightly  cefTRIAXone, 1 g, intravenous, q24h  cephalexin, 500 mg, oral, q6h VICTORIA  docusate sodium, 100 mg, oral, BID  [Held by provider] enoxaparin, 40 mg, subcutaneous, Daily  levothyroxine, 75 mcg, oral, Daily before breakfast      Continuous medications     PRN medications  PRN medications: benzocaine-menthol, bisacodyl, diphenhydrAMINE, HYDROmorphone, naloxone, naloxone, naloxone, ondansetron ODT **OR** ondansetron, oxyCODONE, prochlorperazine **OR** prochlorperazine **OR** prochlorperazine    Assessment/Plan   Principal Problem:    Oth fracture of unsp femur, init encntr for closed fracture (CMS/[facility])  Active Problems:    Closed fracture of neck of right femur with malunion    Weakness    Ambulatory dysfunction    Closed fracture of neck of right femur (CMS/HCC)  Acute blood loss anemia    Postop day 3 of right hemiarthroplasty  Physical and Occupational Therapy are on consult  Weightbearing as tolerated  with walker  Currently medications for VTE prophylaxis are on hold, patient with acute blood loss anemia, hemoglobin 8.2 today, hemodynamically stable, continue to monitor hemoglobin and hematocrit, start Lovenox for each VTE prophylaxis when oozing from incisional wound stops  Will check Doppler ultrasound of lower extremities to rule out DVT in light of inability to place on VTE prophylaxis  Will start Keflex 500 mg p.o. 4 times daily for 5 days to prevent infection in surgical area since area is still oozing  Labs reviewed  Multimodal pain regime  Home medications ordered for chronic medical conditions as appropriate  Bowel regime  Encourage incentive spirometry  Plans on discharge to to acute rehab, pending insurance authorization  Follow-up with Dr. Barros as directed       I spent 40 minutes in the professional and overall care of this patient.      Kelin Tapia PA-C

## 2024-01-12 NOTE — PROGRESS NOTES
Physical Therapy    Physical Therapy Treatment    Patient Name: Lona Sims  MRN: [id number]  Today's Date: 1/12/2024  Time Calculation  Start Time: 1400  Stop Time: 1429  Time Calculation (min): 29 min       Assessment/Plan   PT Assessment  PT Assessment Results: Decreased strength, Decreased endurance, Impaired balance, Decreased mobility  Rehab Prognosis: Good  End of Session Communication: Bedside nurse, Care Coordinator  End of Session Patient Position: Up in chair, Alarm on  PT Plan  Inpatient/Swing Bed or Outpatient: Inpatient  PT Plan  Treatment/Interventions: Bed mobility, Transfer training, Gait training, Strengthening  PT Plan: Skilled PT  PT Frequency: Daily  PT Discharge Recommendations: Moderate intensity level of continued care  Equipment Recommended upon Discharge: Wheeled walker  PT Recommended Transfer Status: Assist x2       01/12/24 1400   PT  Visit   PT Received On 01/12/24   Response to Previous Treatment Patient with no complaints from previous session.   General   Prior to Session Communication Bedside nurse   Patient Position Received Up in chair;Alarm on   Preferred Learning Style verbal;visual   Precautions   LE Weight Bearing Status Weight Bearing as Tolerated   Medical Precautions Fall precautions   Post-Surgical Precautions Right hip precautions   Pain Assessment   Pain Assessment 0-10   Pain Score 5 - Moderate pain   Pain Type Surgical pain   Pain Location Leg   Pain Orientation Right   Pain Descriptors Aching   Cognition   Overall Cognitive Status WFL   Orientation Level Oriented X4   Therapeutic Exercise   Therapeutic Exercise Performed Yes   Therapeutic Exercise Activity 1 PD/DF x10   Therapeutic Exercise Activity 2 marches x10   Therapeutic Exercise Activity 3 LAQ x10   Bed Mobility   Bed Mobility Yes   Bed Mobility 2   Bed Mobility  2 Sitting to supine   Level of Assistance 2 Maximum assistance   Ambulation/Gait Training   Ambulation/Gait Training Performed Yes   Ambulation/Gait  Training 1   Surface 1 Level tile   Device 1 No device   Gait Support Devices Gait belt   Assistance 1 Maximum assistance;Arm in arm assistance   Quality of Gait 1 Diminished heel strike;Inconsistent stride length;Decreased step length;Foot drop/steppage gait;Antalgic   Comments/Distance (ft) 1 unable to bear enough weight on RLE for functional steps and unable to utilize walker due to limited mobility RUE   Transfers   Transfer Yes   Transfer 1   Transfer From 1 Chair with arms to   Transfer to 1 Stand   Technique 1 Sit to stand   Transfer Device 1 Gait belt   Transfer Level of Assistance 1 Moderate assistance   Transfers 2   Transfer From 2 Stand to   Transfer to 2 Bed   Technique 2 Stand to sit   Transfer Device 2 Gait belt   Transfer Level of Assistance 2 Hand held assistance;Minimum assistance   Activity Tolerance   Endurance Tolerates 30 min exercise with multiple rests   PT Assessment   PT Assessment Results Decreased strength;Decreased endurance;Impaired balance;Decreased mobility   Rehab Prognosis Good   End of Session Communication Bedside nurse;Care Coordinator   End of Session Patient Position Up in chair;Alarm on   Outpatient Education   Individual(s) Educated Patient   Education Provided Fall Risk   PT Plan   Inpatient/Swing Bed or Outpatient Inpatient   PT Plan   Treatment/Interventions Bed mobility;Transfer training;Gait training;Strengthening   PT Plan Skilled PT   PT Frequency Daily   PT Discharge Recommendations Moderate intensity level of continued care   Equipment Recommended upon Discharge Wheeled walker   PT Recommended Transfer Status Assist x2     Outcome Measures:  [facility] Basic Mobility  Turning from your back to your side while in a flat bed without using bedrails: A lot  Moving from lying on your back to sitting on the side of a flat bed without using bedrails: A lot  Moving to and from bed to chair (including a wheelchair): A lot  Standing up from a chair using your arms (e.g. wheelchair  or bedside chair): A lot  To walk in hospital room: A lot  Climbing 3-5 steps with railing: Total  Basic Mobility - Total Score: 11      EDUCATION:  Outpatient Education  Individual(s) Educated: Patient  Education Provided: Fall Risk  Education Documentation  Precautions, taught by Pb Fong PTA at 1/12/2024  2:34 PM.  Learner: Patient  Readiness: Acceptance  Method: Explanation  Response: Verbalizes Understanding, Needs Reinforcement    Mobility Training, taught by Pb Fong PTA at 1/12/2024  2:34 PM.  Learner: Patient  Readiness: Acceptance  Method: Explanation  Response: Verbalizes Understanding, Needs Reinforcement    GOALS:  Encounter Problems       Encounter Problems (Active)       PT Problem       PT Goal 1 (Progressing)       Start:  01/10/24    Expected End:  01/24/24       Pt able to perform bed mobility with mod assist.           PT Goal 2 (Progressing)       Start:  01/10/24    Expected End:  01/24/24       Pt able to complete all transfers with mod assist.            PT Goal 3 (Progressing)       Start:  01/10/24    Expected End:  01/24/24       Pt able to ambulate 20 feet with quad cane and mod assist of 2.              Pain - Adult          Transfers       STG - Patient will perform toilet transfer min assist (Progressing)       Start:  01/10/24    Expected End:  01/24/24            STG - Patient will follow hip precautions during transfers (Progressing)       Start:  01/10/24    Expected End:  01/24/24

## 2024-01-13 VITALS
SYSTOLIC BLOOD PRESSURE: 102 MMHG | RESPIRATION RATE: 16 BRPM | WEIGHT: 79.37 LBS | DIASTOLIC BLOOD PRESSURE: 53 MMHG | TEMPERATURE: 98.6 F | BODY MASS INDEX: 16.66 KG/M2 | HEIGHT: 58 IN | HEART RATE: 76 BPM | OXYGEN SATURATION: 96 %

## 2024-01-13 LAB
ALBUMIN SERPL BCP-MCNC: 2.7 G/DL (ref 3.4–5)
ANION GAP SERPL CALC-SCNC: 10 MMOL/L (ref 10–20)
BUN SERPL-MCNC: 13 MG/DL (ref 6–23)
CALCIUM SERPL-MCNC: 7.7 MG/DL (ref 8.6–10.3)
CHLORIDE SERPL-SCNC: 101 MMOL/L (ref 98–107)
CO2 SERPL-SCNC: 27 MMOL/L (ref 21–32)
CREAT SERPL-MCNC: 0.56 MG/DL (ref 0.5–1.05)
EGFRCR SERPLBLD CKD-EPI 2021: 88 ML/MIN/1.73M*2
ERYTHROCYTE [DISTWIDTH] IN BLOOD BY AUTOMATED COUNT: 21.5 % (ref 11.5–14.5)
GLUCOSE SERPL-MCNC: 79 MG/DL (ref 74–99)
HCT VFR BLD AUTO: 28.2 % (ref 36–46)
HGB BLD-MCNC: 8.9 G/DL (ref 12–16)
MCH RBC QN AUTO: 27.5 PG (ref 26–34)
MCHC RBC AUTO-ENTMCNC: 31.6 G/DL (ref 32–36)
MCV RBC AUTO: 87 FL (ref 80–100)
NRBC BLD-RTO: 0 /100 WBCS (ref 0–0)
PHOSPHATE SERPL-MCNC: 2.1 MG/DL (ref 2.5–4.9)
PLATELET # BLD AUTO: 203 X10*3/UL (ref 150–450)
POTASSIUM SERPL-SCNC: 4 MMOL/L (ref 3.5–5.3)
RBC # BLD AUTO: 3.24 X10*6/UL (ref 4–5.2)
SODIUM SERPL-SCNC: 134 MMOL/L (ref 136–145)
WBC # BLD AUTO: 11.1 X10*3/UL (ref 4.4–11.3)

## 2024-01-13 PROCEDURE — 99232 SBSQ HOSP IP/OBS MODERATE 35: CPT | Performed by: PHYSICIAN ASSISTANT

## 2024-01-13 PROCEDURE — 2500000001 HC RX 250 WO HCPCS SELF ADMINISTERED DRUGS (ALT 637 FOR MEDICARE OP): Performed by: ORTHOPAEDIC SURGERY

## 2024-01-13 PROCEDURE — 2500000001 HC RX 250 WO HCPCS SELF ADMINISTERED DRUGS (ALT 637 FOR MEDICARE OP): Performed by: STUDENT IN AN ORGANIZED HEALTH CARE EDUCATION/TRAINING PROGRAM

## 2024-01-13 PROCEDURE — 99223 1ST HOSP IP/OBS HIGH 75: CPT | Performed by: PHYSICAL MEDICINE & REHABILITATION

## 2024-01-13 PROCEDURE — 2500000001 HC RX 250 WO HCPCS SELF ADMINISTERED DRUGS (ALT 637 FOR MEDICARE OP): Performed by: PHYSICIAN ASSISTANT

## 2024-01-13 PROCEDURE — 36415 COLL VENOUS BLD VENIPUNCTURE: CPT | Performed by: STUDENT IN AN ORGANIZED HEALTH CARE EDUCATION/TRAINING PROGRAM

## 2024-01-13 PROCEDURE — 80069 RENAL FUNCTION PANEL: CPT | Performed by: STUDENT IN AN ORGANIZED HEALTH CARE EDUCATION/TRAINING PROGRAM

## 2024-01-13 PROCEDURE — 85027 COMPLETE CBC AUTOMATED: CPT | Performed by: STUDENT IN AN ORGANIZED HEALTH CARE EDUCATION/TRAINING PROGRAM

## 2024-01-13 RX ORDER — SENNOSIDES 8.6 MG/1
1 TABLET ORAL NIGHTLY
Status: DISCONTINUED | OUTPATIENT
Start: 2024-01-13 | End: 2024-01-13 | Stop reason: HOSPADM

## 2024-01-13 RX ORDER — SENNOSIDES 8.6 MG/1
1 TABLET ORAL NIGHTLY
Start: 2024-01-13

## 2024-01-13 RX ORDER — ENOXAPARIN SODIUM 100 MG/ML
30 INJECTION SUBCUTANEOUS DAILY
Qty: 28 EACH | Refills: 0
Start: 2024-01-13 | End: 2024-02-10

## 2024-01-13 RX ADMIN — BISACODYL 10 MG: 5 TABLET, COATED ORAL at 09:23

## 2024-01-13 RX ADMIN — AMLODIPINE BESYLATE 5 MG: 5 TABLET ORAL at 09:23

## 2024-01-13 RX ADMIN — OXYCODONE HYDROCHLORIDE 5 MG: 5 TABLET ORAL at 01:55

## 2024-01-13 RX ADMIN — ACETAMINOPHEN 650 MG: 325 TABLET ORAL at 00:43

## 2024-01-13 RX ADMIN — CEPHALEXIN 500 MG: 500 CAPSULE ORAL at 05:54

## 2024-01-13 RX ADMIN — LEVOTHYROXINE SODIUM 75 MCG: 75 TABLET ORAL at 05:55

## 2024-01-13 RX ADMIN — DOCUSATE SODIUM 100 MG: 100 CAPSULE, LIQUID FILLED ORAL at 09:23

## 2024-01-13 RX ADMIN — ACETAMINOPHEN 650 MG: 325 TABLET ORAL at 05:54

## 2024-01-13 RX ADMIN — CEPHALEXIN 500 MG: 500 CAPSULE ORAL at 00:43

## 2024-01-13 ASSESSMENT — PAIN - FUNCTIONAL ASSESSMENT
PAIN_FUNCTIONAL_ASSESSMENT: 0-10

## 2024-01-13 ASSESSMENT — COGNITIVE AND FUNCTIONAL STATUS - GENERAL
TURNING FROM BACK TO SIDE WHILE IN FLAT BAD: A LOT
DAILY ACTIVITIY SCORE: 16
DRESSING REGULAR UPPER BODY CLOTHING: A LITTLE
STANDING UP FROM CHAIR USING ARMS: A LOT
MOVING TO AND FROM BED TO CHAIR: A LOT
WALKING IN HOSPITAL ROOM: A LOT
PERSONAL GROOMING: A LITTLE
TOILETING: A LOT
CLIMB 3 TO 5 STEPS WITH RAILING: TOTAL
MOBILITY SCORE: 11
MOVING FROM LYING ON BACK TO SITTING ON SIDE OF FLAT BED WITH BEDRAILS: A LOT
HELP NEEDED FOR BATHING: A LOT
DRESSING REGULAR LOWER BODY CLOTHING: A LOT

## 2024-01-13 ASSESSMENT — PAIN SCALES - GENERAL
PAINLEVEL_OUTOF10: 1
PAINLEVEL_OUTOF10: 5 - MODERATE PAIN
PAINLEVEL_OUTOF10: 0 - NO PAIN

## 2024-01-13 NOTE — NURSING NOTE
10:11 - attempted to call report two times to [facility] acute rehab, no answer and did not get an answering service. Number called: [phone]. Pt has not been picked up yet, but was supposed to be picked up at 10:00    EOS note: Pt picked up at 1023. No reports of pain this shift, VSS. Pt ambulated to [facility] with a one assist. Ate 100% of breakfast. Remained on RA. Dressing on hip changed on overnight shift, no drainage reported through the dressing. Pt's daughter is at bedside at this time and is taking her belongings to the acute rehab. No concerns at this time. Remained safe with bed locked in lowest position and alarm on. Personal possessions within reach.    1110 - dlae called back from [facility] acute rehab to get report for this patient

## 2024-01-13 NOTE — PROGRESS NOTES
"Lona Sims is a 88 y.o. female on day 5 of admission presenting with Oth fracture of unsp femur, init encntr for closed fracture (CMS/HCC).    Subjective   Lying in bed upon entering room.  States feeling much better today.  Pain is tolerable especially with pain medication.  Tolerating a diet with no nausea or vomiting.  Voiding well.  In the process of being discharged to acute rehab.       Objective     Physical Exam  Constitutional:       Appearance: Normal appearance.   HENT:      Head: Normocephalic and atraumatic.      Nose: Nose normal.      Mouth/Throat:      Mouth: Mucous membranes are moist.   Cardiovascular:      Rate and Rhythm: Normal rate.   Pulmonary:      Effort: Pulmonary effort is normal.   Abdominal:      General: Abdomen is flat.      Palpations: Abdomen is soft.   Musculoskeletal:      Cervical back: Neck supple.      Comments: Right hip with Aquacel dressing in place no staining appreciated, distally sensation is present throughout right lower extremity, DP pulse palpable right foot, plantar and dorsiflexion against resistance present in right foot   Skin:     General: Skin is warm and dry.   Neurological:      General: No focal deficit present.      Mental Status: She is alert and oriented to person, place, and time.   Psychiatric:         Mood and Affect: Mood normal.         Last Recorded Vitals  Blood pressure 102/53, pulse 76, temperature 37 °C (98.6 °F), resp. rate 16, height 1.473 m (4' 10\"), weight (!) 36 kg (79 lb 5.9 oz), SpO2 96 %.  Intake/Output last 3 Shifts:  I/O last 3 completed shifts:  In: 580 (16.1 mL/kg) [P.O.:480; IV Piggyback:100]  Out: 750 (20.8 mL/kg) [Urine:750 (0.6 mL/kg/hr)]  Weight: 36 kg     Relevant Results  Results for orders placed or performed during the hospital encounter of 01/08/24 (from the past 24 hour(s))   Lower extremity venous duplex bilateral   Result Value Ref Range    BSA 1.21 m2   CBC   Result Value Ref Range    WBC 11.1 4.4 - 11.3 x10*3/uL    " nRBC 0.0 0.0 - 0.0 /100 WBCs    RBC 3.24 (L) 4.00 - 5.20 x10*6/uL    Hemoglobin 8.9 (L) 12.0 - 16.0 g/dL    Hematocrit 28.2 (L) 36.0 - 46.0 %    MCV 87 80 - 100 fL    MCH 27.5 26.0 - 34.0 pg    MCHC 31.6 (L) 32.0 - 36.0 g/dL    RDW 21.5 (H) 11.5 - 14.5 %    Platelets 203 150 - 450 x10*3/uL   Renal function panel   Result Value Ref Range    Glucose 79 74 - 99 mg/dL    Sodium 134 (L) 136 - 145 mmol/L    Potassium 4.0 3.5 - 5.3 mmol/L    Chloride 101 98 - 107 mmol/L    Bicarbonate 27 21 - 32 mmol/L    Anion Gap 10 10 - 20 mmol/L    Urea Nitrogen 13 6 - 23 mg/dL    Creatinine 0.56 0.50 - 1.05 mg/dL    eGFR 88 >60 mL/min/1.73m*2    Calcium 7.7 (L) 8.6 - 10.3 mg/dL    Phosphorus 2.1 (L) 2.5 - 4.9 mg/dL    Albumin 2.7 (L) 3.4 - 5.0 g/dL     Scheduled medications  acetaminophen, 650 mg, oral, q6h VICTORIA  amLODIPine, 5 mg, oral, Daily  [Held by provider] aspirin, 81 mg, oral, Daily  atorvastatin, 40 mg, oral, Nightly  [Held by provider] cefTRIAXone, 1 g, intravenous, q24h  cephalexin, 500 mg, oral, q6h VICTORIA  docusate sodium, 100 mg, oral, BID  [Held by provider] enoxaparin, 40 mg, subcutaneous, Daily  levothyroxine, 75 mcg, oral, Daily before breakfast  sennosides, 1 tablet, oral, Nightly      Continuous medications     PRN medications  PRN medications: benzocaine-menthol, bisacodyl, diphenhydrAMINE, HYDROmorphone, naloxone, naloxone, naloxone, ondansetron ODT **OR** ondansetron, oxyCODONE, prochlorperazine **OR** prochlorperazine **OR** prochlorperazine    Assessment/Plan   Principal Problem:    Oth fracture of unsp femur, init encntr for closed fracture (CMS/HCC)  Active Problems:    Closed fracture of neck of right femur with malunion    Weakness    Ambulatory dysfunction    Closed fracture of neck of right femur (CMS/HCC)    Postop day 4 of right hemiarthroplasty  Physical and Occupational Therapy are on consult  Weightbearing as tolerated with walker  Resume Lovenox for VTE prophylaxis and continue for 28 days  Labs  reviewed  Multimodal pain regime  Home medications ordered for chronic medical conditions as appropriate  Bowel regime  Encourage incentive spirometry  Plans on discharge to acute rehab  Follow-up with Dr. Zachariah Barros as directed       I spent 25 minutes in the professional and overall care of this patient.      Kelin Tapia PA-C

## 2024-01-13 NOTE — HOSPITAL COURSE
Lona Sims is a 88 y.o. female presenting with chief complaint of hip pain after sustaining what is described as a mechanical fall.  Patient was reportedly a sending a small staircase when she lost her balance and fell approximately 2 steps, with 4 feet to the ground with the majority of impact absorbed by her right hip.  Hip pain upon arrival was reported as 5 out of 10 in severity.  There was noted range of motion restrictions involving the right hip as well as tenderness to palpation.  Imaging obtained showed evidence of right femur fracture.  Case was discussed with orthopedic service.  Patient admitted for further evaluation.     Hospital course: Underwent R hip hemiarthroplasty on 1/9, appears to have been well-tolerated without intraoperative complications. On 1/10 did have acute post-operative anemia and required 1u pRBCs for Hb 6.6. Anticoagulation continued to be held through 1/11 and resumed after Hb stabilized. UA was concerning for UTI with positive leuk esterase, was started on ceftriaxone. Per orthopedics pt had DVT US of BLE to screen for DVT while off prophylaxis, and started keflex 500mg po qid x 5 days for surgical site prophylaxis. Pain well-controlled. Keflex briefly held through 1/12 while CTX ongoing, however with absence of urinary symptoms and no growth on urine after 24 hours, elected to hold CTX and resume Keflex. Pt to go to acute rehab per PT/OT with [facility], medically ready on 1/12.

## 2024-01-13 NOTE — PROGRESS NOTES
Noted that patient for discharge to Acute Rehab this am. Transport is set for 10:00 am. Notified acute rehab of time of transfer, notified nursing of number to call for report [phone].

## 2024-01-13 NOTE — DISCHARGE SUMMARY
Discharge Diagnosis  Oth fracture of unsp femur, init encntr for closed fracture (CMS/[facility])    Issues Requiring Follow-Up  N/A    Discharge Meds     Your medication list        START taking these medications        Instructions Last Dose Given Next Dose Due   cephalexin 500 mg capsule  Commonly known as: Keflex      Take 1 capsule (500 mg) by mouth every 6 hours for 5 days. Do not start before January 13, 2024.       ondansetron ODT 4 mg disintegrating tablet  Commonly known as: Zofran-ODT      Take 1 tablet (4 mg) by mouth every 8 hours if needed for nausea or vomiting.       oxyCODONE 5 mg immediate release tablet  Commonly known as: Roxicodone      Take 1 tablet (5 mg) by mouth every 8 hours if needed for moderate pain (4 - 6) or severe pain (7 - 10) for up to 3 days.              CONTINUE taking these medications        Instructions Last Dose Given Next Dose Due   amLODIPine 5 mg tablet  Commonly known as: Norvasc           aspirin 81 mg EC tablet           atorvastatin 40 mg tablet  Commonly known as: Lipitor           CALCIUM 500 ORAL           cholecalciferol 50 MCG (2000 UT) tablet  Commonly known as: Vitamin D-3           levothyroxine 75 mcg tablet  Commonly known as: Synthroid, Levoxyl                     Where to Get Your Medications        These medications were sent to [facility] [address]  [address] [address]      Phone: [phone]   cephalexin 500 mg capsule       Information about where to get these medications is not yet available    Ask your nurse or doctor about these medications  ondansetron ODT 4 mg disintegrating tablet  oxyCODONE 5 mg immediate release tablet         Test Results Pending At Discharge  Pending Labs       Order Current Status    Blood Culture Preliminary result    Blood Culture Preliminary result            Hospital Course  Lona Sims is a 88 y.o. female presenting with chief complaint of hip  pain after sustaining what is described as a mechanical fall.  Patient was reportedly a sending a small staircase when she lost her balance and fell approximately 2 steps, with 4 feet to the ground with the majority of impact absorbed by her right hip.  Hip pain upon arrival was reported as 5 out of 10 in severity.  There was noted range of motion restrictions involving the right hip as well as tenderness to palpation.  Imaging obtained showed evidence of right femur fracture.  Case was discussed with orthopedic service.  Patient admitted for further evaluation.     Hospital course: Underwent R hip hemiarthroplasty on 1/9, appears to have been well-tolerated without intraoperative complications. On 1/10 did have acute post-operative anemia and required 1u pRBCs for Hb 6.6. Anticoagulation continued to be held through 1/11 and resumed after Hb stabilized. UA was concerning for UTI with positive leuk esterase, was started on ceftriaxone. Per orthopedics pt had DVT US of BLE to screen for DVT while off prophylaxis, and started keflex 500mg po qid x 5 days for surgical site prophylaxis. Pain well-controlled. Keflex briefly held through 1/12 while CTX ongoing, however with absence of urinary symptoms and no growth on urine after 24 hours, elected to hold CTX and resume Keflex. Pt to go to acute rehab per PT/OT with [facility], medically ready on 1/12.    Addendum: Patient was noted to have no BM since just prior to admission--without abdominal/GI complaints at time of discharge. Added senna to existing bowel regimen and may be titrated for effect at acute rehab.    Pertinent Physical Exam At Time of Discharge  Physical Exam  Vitals reviewed.   Constitutional:       General: She is not in acute distress.     Appearance: Normal appearance.   HENT:      Head: Normocephalic and atraumatic.      Right Ear: External ear normal.      Left Ear: External ear normal.      Nose: Nose normal.      Mouth/Throat:      Mouth: Mucous  membranes are moist.      Pharynx: Oropharynx is clear.   Eyes:      Extraocular Movements: Extraocular movements intact.   Cardiovascular:      Rate and Rhythm: Normal rate and regular rhythm.   Pulmonary:      Effort: Pulmonary effort is normal. No respiratory distress.      Breath sounds: Normal breath sounds.   Abdominal:      General: There is no distension.      Palpations: Abdomen is soft.      Tenderness: There is no abdominal tenderness.   Musculoskeletal:         General: Normal range of motion.      Cervical back: Normal range of motion.      Comments: Surgical site dressing in place, grossly CDI.   Skin:     General: Skin is warm and dry.   Neurological:      General: No focal deficit present.      Mental Status: She is alert and oriented to person, place, and time. Mental status is at baseline.   Psychiatric:         Mood and Affect: Mood normal.         Behavior: Behavior normal.         Outpatient Follow-Up  No future appointments.    I was the attending physician for this patient. Coordination of care and discharge process provided by me on day of discharge was > 30 minutes.    Luiz Corbett DO  /[facility]

## 2024-01-13 NOTE — PROGRESS NOTES
"Physical Therapy                 Therapy Communication Note    Patient Name: Lona Sims  MRN: [id number]  Today's Date: 1/13/2024     Discipline: Physical Therapy    Missed Visit Reason:  Discharged     Missed Time: Attempt    Comment: Nurse stated , \" She just left .\"   "

## 2024-01-15 LAB
BACTERIA BLD CULT: NORMAL
BACTERIA BLD CULT: NORMAL

## 2024-01-22 ENCOUNTER — APPOINTMENT (OUTPATIENT)
Dept: ORTHOPEDIC SURGERY | Facility: CLINIC | Age: 89
End: 2024-01-22
Payer: MEDICARE

## 2024-01-24 ENCOUNTER — OFFICE VISIT (OUTPATIENT)
Dept: ORTHOPEDIC SURGERY | Facility: CLINIC | Age: 89
End: 2024-01-24
Payer: MEDICARE

## 2024-01-24 ENCOUNTER — HOSPITAL ENCOUNTER (OUTPATIENT)
Dept: RADIOLOGY | Facility: CLINIC | Age: 89
Discharge: HOME | End: 2024-01-24
Payer: MEDICARE

## 2024-01-24 DIAGNOSIS — S72.001S CLOSED FRACTURE OF NECK OF RIGHT FEMUR, SEQUELA: Primary | ICD-10-CM

## 2024-01-24 DIAGNOSIS — S72.001D CLOSED FRACTURE OF NECK OF RIGHT FEMUR WITH ROUTINE HEALING, SUBSEQUENT ENCOUNTER: ICD-10-CM

## 2024-01-24 DIAGNOSIS — M80.859A PATHOLOGICAL FRACTURE OF FEMUR DUE TO SECONDARY OSTEOPOROSIS (MULTI): ICD-10-CM

## 2024-01-24 PROCEDURE — 73502 X-RAY EXAM HIP UNI 2-3 VIEWS: CPT | Mod: RT

## 2024-01-24 PROCEDURE — 1159F MED LIST DOCD IN RCRD: CPT | Performed by: PHYSICIAN ASSISTANT

## 2024-01-24 PROCEDURE — 73502 X-RAY EXAM HIP UNI 2-3 VIEWS: CPT | Mod: RIGHT SIDE | Performed by: ORTHOPAEDIC SURGERY

## 2024-01-24 PROCEDURE — 1126F AMNT PAIN NOTED NONE PRSNT: CPT | Performed by: PHYSICIAN ASSISTANT

## 2024-01-24 PROCEDURE — 99232 SBSQ HOSP IP/OBS MODERATE 35: CPT | Performed by: PHYSICAL MEDICINE & REHABILITATION

## 2024-01-24 PROCEDURE — 99024 POSTOP FOLLOW-UP VISIT: CPT | Performed by: PHYSICIAN ASSISTANT

## 2024-01-24 PROCEDURE — 1036F TOBACCO NON-USER: CPT | Performed by: PHYSICIAN ASSISTANT

## 2024-01-24 PROCEDURE — 1157F ADVNC CARE PLAN IN RCRD: CPT | Performed by: PHYSICIAN ASSISTANT

## 2024-01-24 PROCEDURE — 1111F DSCHRG MED/CURRENT MED MERGE: CPT | Performed by: PHYSICIAN ASSISTANT

## 2024-01-24 NOTE — PROGRESS NOTES
History: Lona is here for a postop visit of her right hip.  She is 2 weeks out from a right hip hemiarthroplasty for femoral neck fracture.  She has been staying at a rehab facility.  She has been working with therapy and states that she is starting to walk longer distances.    Physical Exam: The wound is healing nicely.  There is normal ecchymosis and swelling.  There is no drainage.  She is able to move the foot and ankle well.  Minimal pain with gentle hip rotation.  She is neurovascularly intact distally.    Radiographs: AP and lateral views of the right hip show good alignment of her hip hemiarthroplasty.    Assessment: Stable right hip hemiarthroplasty for a femoral neck fracture, 2 weeks out    Plan: Her staples were removed today.  Benzoin and Steri-Strips were applied.  She can continue to weight-bear as tolerated and work with therapy in the rehab.  She can ice the hip as needed.  We discussed continuing with hip precautions and these were discussed again with the patient who is already aware.  She is going to follow-up in another 4 weeks to assess progress with 2 view right hip x-rays.  All questions were answered with the patient.

## 2024-02-23 ENCOUNTER — HOSPITAL ENCOUNTER (OUTPATIENT)
Dept: RADIOLOGY | Facility: CLINIC | Age: 89
Discharge: HOME | End: 2024-02-23
Payer: MEDICARE

## 2024-02-23 ENCOUNTER — OFFICE VISIT (OUTPATIENT)
Dept: ORTHOPEDIC SURGERY | Facility: CLINIC | Age: 89
End: 2024-02-23
Payer: MEDICARE

## 2024-02-23 DIAGNOSIS — M25.551 RIGHT HIP PAIN: ICD-10-CM

## 2024-02-23 PROCEDURE — 1126F AMNT PAIN NOTED NONE PRSNT: CPT | Performed by: ORTHOPAEDIC SURGERY

## 2024-02-23 PROCEDURE — 99024 POSTOP FOLLOW-UP VISIT: CPT | Performed by: ORTHOPAEDIC SURGERY

## 2024-02-23 PROCEDURE — 1036F TOBACCO NON-USER: CPT | Performed by: ORTHOPAEDIC SURGERY

## 2024-02-23 PROCEDURE — 73502 X-RAY EXAM HIP UNI 2-3 VIEWS: CPT | Mod: RIGHT SIDE | Performed by: RADIOLOGY

## 2024-02-23 PROCEDURE — 1157F ADVNC CARE PLAN IN RCRD: CPT | Performed by: ORTHOPAEDIC SURGERY

## 2024-02-23 PROCEDURE — 1159F MED LIST DOCD IN RCRD: CPT | Performed by: ORTHOPAEDIC SURGERY

## 2024-02-23 PROCEDURE — 73502 X-RAY EXAM HIP UNI 2-3 VIEWS: CPT | Mod: RT

## 2024-02-23 NOTE — PROGRESS NOTES
History: Lona is here for recheck of her right hip.  She is 6 weeks out from a right hip hemiarthroplasty for femoral neck fracture.  She is now back home and continuing to work with home PT.  She feels she is making progress.  She has no pain.    Physical Exam: Wound is healing nicely.  She has no pain with internal or external hip rotation.  She has good foot and ankle range of motion.  She denies any numbness or tingling in the leg.    Radiographs: AP and lateral views of the right hip show alignment of her hip hemiarthroplasty with no loosening.    Assessment: Stable right hip hemiarthroplasty for femoral neck fracture, 6 weeks out    Plan: She is doing very well and can continue to weight-bear as tolerated.  She is going to finish out her home therapy program.  We did discuss continuing with her hip precautions until April 1.  At that point she can be released to full activity.  We would be happy to see her back with any problems but if continuing to do well can follow-up on an as-needed basis.  All questions were answered the patient.

## (undated) DEVICE — Device

## (undated) DEVICE — TOWEL PACK, STERILE, 4/PACK, BLUE

## (undated) DEVICE — SUTURE, MONOCRYL, 4-0, 27 IN, PS-2, UNDYED

## (undated) DEVICE — SUTURE, ETHIBOND, 5, 30 IN, V40, MULTIPACK, GREEN

## (undated) DEVICE — DRAPE, TIBURON, SPLIT SHEET, REINF ADH STRIP, 77X122

## (undated) DEVICE — FEMORAL CANAL TIP FOR INTERPULSE HANDPIECE SET

## (undated) DEVICE — CEMENT MIX KIT, REVOLUTION, W/BREAKAWAY

## (undated) DEVICE — GLOVE, SURGICAL, BIOGEL, 7.5, PF, LATEX, GREEN

## (undated) DEVICE — BLADE, GEN COATED 2.75, LF

## (undated) DEVICE — WOUND SYSTEM, DEBRIDEMENT & CLEANING, O.R DUOPAK

## (undated) DEVICE — INTERPULSE HANDPIECE SET W/ 10FT SUCTION TUBING

## (undated) DEVICE — SOLUTION, IRRIGATION, USP, SODIUM CHLORIDE 0.9%, 3000 ML, BAG

## (undated) DEVICE — BLADE, SAW, SAGITTAL, 21 X 84.5 X 0.89 MM, STAINLESS STEEL, STERILE

## (undated) DEVICE — PILLOW, ABDUCTION, MEDIUM

## (undated) DEVICE — GLOVE, SURGICAL, PROTEXIS PI , 7.5, PF, LF

## (undated) DEVICE — SUTURE, VICRYL, 2-0, 36 IN, CT-1, UNDYED

## (undated) DEVICE — HOOD, SURGICAL, FLYTE SURGICOOL

## (undated) DEVICE — SUTURE, ETHIBOND XTRA, 5 CCS, GRN/BR, LF

## (undated) DEVICE — DRAPE, SHEET, U, STERI DRAPE, 47 X 51 IN, DISPOSABLE, STERILE

## (undated) DEVICE — TIP, SUCTION, YANKAUER, FLEXIBLE

## (undated) DEVICE — RESERVOIR, WOUND, W/TROCAR, PVC, MEDIUM, 400CC, DAVOL, 1/8 IN, 10FR

## (undated) DEVICE — SUTURE, VICRYL, 1, 27 IN, CT-1 CR, UNDYED

## (undated) DEVICE — HIGH FLOW TIP FOR INTERPULSE HANDPIECE SET

## (undated) DEVICE — DRESSING, AQUACEL AG, HYDROFIBER W/SILVER, 3.5 X 12 IN

## (undated) DEVICE — SOLUTION, IRRIGATION, STERILE WATER, 1000 ML, POUR BOTTLE

## (undated) DEVICE — GLOVE, SURGICAL, PROTEXIS PI BLUE W/NEUTHERA, 7.0, PF, LF

## (undated) DEVICE — APPLICATOR, CHLORAPREP, W/ORANGE TINT, 26ML

## (undated) DEVICE — GOWN, SURGICAL, NON-REINFORCED, XLARGE, LF